# Patient Record
Sex: MALE | URBAN - METROPOLITAN AREA
[De-identification: names, ages, dates, MRNs, and addresses within clinical notes are randomized per-mention and may not be internally consistent; named-entity substitution may affect disease eponyms.]

---

## 2020-03-26 ENCOUNTER — APPOINTMENT (OUTPATIENT)
Dept: RADIOLOGY | Facility: MEDICAL CENTER | Age: 60
DRG: 641 | End: 2020-03-26
Attending: INTERNAL MEDICINE
Payer: COMMERCIAL

## 2020-03-26 ENCOUNTER — HOSPITAL ENCOUNTER (OUTPATIENT)
Facility: MEDICAL CENTER | Age: 60
DRG: 641 | End: 2020-03-26
Admitting: INTERNAL MEDICINE
Payer: COMMERCIAL

## 2020-03-26 ENCOUNTER — APPOINTMENT (OUTPATIENT)
Dept: CARDIOLOGY | Facility: MEDICAL CENTER | Age: 60
DRG: 641 | End: 2020-03-26
Attending: INTERNAL MEDICINE
Payer: COMMERCIAL

## 2020-03-26 ENCOUNTER — HOSPITAL ENCOUNTER (INPATIENT)
Facility: MEDICAL CENTER | Age: 60
LOS: 4 days | DRG: 641 | End: 2020-03-30
Attending: INTERNAL MEDICINE | Admitting: HOSPITALIST
Payer: COMMERCIAL

## 2020-03-26 PROBLEM — I10 ESSENTIAL HYPERTENSION: Status: ACTIVE | Noted: 2020-03-26

## 2020-03-26 PROBLEM — D61.818 PANCYTOPENIA (HCC): Status: ACTIVE | Noted: 2020-03-26

## 2020-03-26 PROBLEM — E87.6 HYPOKALEMIA: Status: ACTIVE | Noted: 2020-03-26

## 2020-03-26 PROBLEM — E11.65 UNCONTROLLED TYPE 2 DIABETES MELLITUS WITH HYPERGLYCEMIA (HCC): Status: ACTIVE | Noted: 2020-03-26

## 2020-03-26 PROBLEM — E87.3 METABOLIC ALKALOSIS: Status: ACTIVE | Noted: 2020-03-26

## 2020-03-26 PROBLEM — J44.9 COPD (CHRONIC OBSTRUCTIVE PULMONARY DISEASE) (HCC): Status: ACTIVE | Noted: 2020-03-26

## 2020-03-26 PROBLEM — R79.89 ABNORMAL LFTS: Status: ACTIVE | Noted: 2020-03-26

## 2020-03-26 PROBLEM — R60.0 PEDAL EDEMA: Status: ACTIVE | Noted: 2020-03-26

## 2020-03-26 PROBLEM — C34.90 SMALL CELL CARCINOMA OF LUNG (HCC): Status: ACTIVE | Noted: 2020-03-26

## 2020-03-26 LAB
ACTION RANGE TRIGGERED IACRT: YES
ALBUMIN SERPL BCP-MCNC: 3.3 G/DL (ref 3.2–4.9)
ALBUMIN/GLOB SERPL: 1.9 G/DL
ALP SERPL-CCNC: 203 U/L (ref 30–99)
ALT SERPL-CCNC: 112 U/L (ref 2–50)
ANION GAP SERPL CALC-SCNC: 13 MMOL/L (ref 7–16)
ANISOCYTOSIS BLD QL SMEAR: ABNORMAL
APTT PPP: 20.9 SEC (ref 24.7–36)
AST SERPL-CCNC: 46 U/L (ref 12–45)
BASE EXCESS BLDA CALC-SCNC: 26 MMOL/L (ref -4–3)
BASOPHILS # BLD AUTO: 0 % (ref 0–1.8)
BASOPHILS # BLD: 0 K/UL (ref 0–0.12)
BILIRUB SERPL-MCNC: 0.9 MG/DL (ref 0.1–1.5)
BODY TEMPERATURE: ABNORMAL DEGREES
BUN SERPL-MCNC: 17 MG/DL (ref 8–22)
CALCIUM SERPL-MCNC: 7.8 MG/DL (ref 8.5–10.5)
CHLORIDE SERPL-SCNC: 85 MMOL/L (ref 96–112)
CO2 BLDA-SCNC: >50 MMOL/L (ref 20–33)
CO2 SERPL-SCNC: 42 MMOL/L (ref 20–33)
CREAT SERPL-MCNC: 0.48 MG/DL (ref 0.5–1.4)
EKG IMPRESSION: NORMAL
EOSINOPHIL # BLD AUTO: 0 K/UL (ref 0–0.51)
EOSINOPHIL NFR BLD: 0 % (ref 0–6.9)
ERYTHROCYTE [DISTWIDTH] IN BLOOD BY AUTOMATED COUNT: 58.5 FL (ref 35.9–50)
GIANT PLATELETS BLD QL SMEAR: NORMAL
GLOBULIN SER CALC-MCNC: 1.7 G/DL (ref 1.9–3.5)
GLUCOSE BLD-MCNC: 317 MG/DL (ref 65–99)
GLUCOSE BLD-MCNC: 330 MG/DL (ref 65–99)
GLUCOSE BLD-MCNC: 333 MG/DL (ref 65–99)
GLUCOSE SERPL-MCNC: 300 MG/DL (ref 65–99)
HCO3 BLDA-SCNC: 52.9 MMOL/L (ref 17–25)
HCT VFR BLD AUTO: 30.8 % (ref 42–52)
HGB BLD-MCNC: 9.9 G/DL (ref 14–18)
HOROWITZ INDEX BLDA+IHG-RTO: 1675 MM[HG]
INR PPP: 1.01 (ref 0.87–1.13)
INST. QUALIFIED PATIENT IIQPT: YES
LG PLATELETS BLD QL SMEAR: NORMAL
LV EJECT FRACT  99904: 65
LV EJECT FRACT MOD 2C 99903: 68.54
LV EJECT FRACT MOD 4C 99902: 51.78
LV EJECT FRACT MOD BP 99901: 60.23
LYMPHOCYTES # BLD AUTO: 0.55 K/UL (ref 1–4.8)
LYMPHOCYTES NFR BLD: 13.7 % (ref 22–41)
MACROCYTES BLD QL SMEAR: ABNORMAL
MAGNESIUM SERPL-MCNC: 1.8 MG/DL (ref 1.5–2.5)
MANUAL DIFF BLD: NORMAL
MCH RBC QN AUTO: 33.1 PG (ref 27–33)
MCHC RBC AUTO-ENTMCNC: 32.1 G/DL (ref 33.7–35.3)
MCV RBC AUTO: 103 FL (ref 81.4–97.8)
METAMYELOCYTES NFR BLD MANUAL: 3.4 %
MONOCYTES # BLD AUTO: 0.38 K/UL (ref 0–0.85)
MONOCYTES NFR BLD AUTO: 9.4 % (ref 0–13.4)
MORPHOLOGY BLD-IMP: NORMAL
MYELOCYTES NFR BLD MANUAL: 1.7 %
NEUTROPHILS # BLD AUTO: 2.87 K/UL (ref 1.82–7.42)
NEUTROPHILS NFR BLD: 70.1 % (ref 44–72)
NEUTS BAND NFR BLD MANUAL: 1.7 % (ref 0–10)
NRBC # BLD AUTO: 0.09 K/UL
NRBC BLD-RTO: 2.3 /100 WBC
O2/TOTAL GAS SETTING VFR VENT: 4 %
OVALOCYTES BLD QL SMEAR: NORMAL
PCO2 BLDA: 61.4 MMHG (ref 26–37)
PCO2 TEMP ADJ BLDA: 61.4 MMHG (ref 26–37)
PH BLDA: 7.54 [PH] (ref 7.4–7.5)
PH TEMP ADJ BLDA: 7.54 [PH] (ref 7.4–7.5)
PHOSPHATE SERPL-MCNC: 3.3 MG/DL (ref 2.5–4.5)
PLATELET # BLD AUTO: 134 K/UL (ref 164–446)
PLATELET BLD QL SMEAR: NORMAL
PMV BLD AUTO: 10.3 FL (ref 9–12.9)
PO2 BLDA: 67 MMHG (ref 64–87)
PO2 TEMP ADJ BLDA: 67 MMHG (ref 64–87)
POLYCHROMASIA BLD QL SMEAR: NORMAL
POTASSIUM SERPL-SCNC: 2.5 MMOL/L (ref 3.6–5.5)
POTASSIUM SERPL-SCNC: 3 MMOL/L (ref 3.6–5.5)
POTASSIUM SERPL-SCNC: 3.2 MMOL/L (ref 3.6–5.5)
PROT SERPL-MCNC: 5 G/DL (ref 6–8.2)
PROTHROMBIN TIME: 13.5 SEC (ref 12–14.6)
RBC # BLD AUTO: 2.99 M/UL (ref 4.7–6.1)
RBC BLD AUTO: PRESENT
SAO2 % BLDA: 94 % (ref 93–99)
SODIUM SERPL-SCNC: 140 MMOL/L (ref 135–145)
SPECIMEN DRAWN FROM PATIENT: ABNORMAL
WBC # BLD AUTO: 4 K/UL (ref 4.8–10.8)

## 2020-03-26 PROCEDURE — 700105 HCHG RX REV CODE 258

## 2020-03-26 PROCEDURE — 93005 ELECTROCARDIOGRAM TRACING: CPT | Performed by: INTERNAL MEDICINE

## 2020-03-26 PROCEDURE — 93306 TTE W/DOPPLER COMPLETE: CPT

## 2020-03-26 PROCEDURE — 85610 PROTHROMBIN TIME: CPT

## 2020-03-26 PROCEDURE — 84132 ASSAY OF SERUM POTASSIUM: CPT

## 2020-03-26 PROCEDURE — 83735 ASSAY OF MAGNESIUM: CPT

## 2020-03-26 PROCEDURE — 71045 X-RAY EXAM CHEST 1 VIEW: CPT

## 2020-03-26 PROCEDURE — 84100 ASSAY OF PHOSPHORUS: CPT

## 2020-03-26 PROCEDURE — 85027 COMPLETE CBC AUTOMATED: CPT

## 2020-03-26 PROCEDURE — A9270 NON-COVERED ITEM OR SERVICE: HCPCS | Performed by: INTERNAL MEDICINE

## 2020-03-26 PROCEDURE — 85730 THROMBOPLASTIN TIME PARTIAL: CPT

## 2020-03-26 PROCEDURE — 82803 BLOOD GASES ANY COMBINATION: CPT

## 2020-03-26 PROCEDURE — 94660 CPAP INITIATION&MGMT: CPT

## 2020-03-26 PROCEDURE — 99291 CRITICAL CARE FIRST HOUR: CPT | Performed by: INTERNAL MEDICINE

## 2020-03-26 PROCEDURE — 93970 EXTREMITY STUDY: CPT

## 2020-03-26 PROCEDURE — 700111 HCHG RX REV CODE 636 W/ 250 OVERRIDE (IP): Performed by: INTERNAL MEDICINE

## 2020-03-26 PROCEDURE — 80053 COMPREHEN METABOLIC PANEL: CPT

## 2020-03-26 PROCEDURE — 93010 ELECTROCARDIOGRAM REPORT: CPT | Performed by: INTERNAL MEDICINE

## 2020-03-26 PROCEDURE — 700111 HCHG RX REV CODE 636 W/ 250 OVERRIDE (IP): Performed by: HOSPITALIST

## 2020-03-26 PROCEDURE — 82962 GLUCOSE BLOOD TEST: CPT

## 2020-03-26 PROCEDURE — 93970 EXTREMITY STUDY: CPT | Mod: 26 | Performed by: INTERNAL MEDICINE

## 2020-03-26 PROCEDURE — 770022 HCHG ROOM/CARE - ICU (200)

## 2020-03-26 PROCEDURE — 700102 HCHG RX REV CODE 250 W/ 637 OVERRIDE(OP): Performed by: INTERNAL MEDICINE

## 2020-03-26 PROCEDURE — 700105 HCHG RX REV CODE 258: Performed by: INTERNAL MEDICINE

## 2020-03-26 PROCEDURE — 36600 WITHDRAWAL OF ARTERIAL BLOOD: CPT

## 2020-03-26 PROCEDURE — 93306 TTE W/DOPPLER COMPLETE: CPT | Mod: 26 | Performed by: INTERNAL MEDICINE

## 2020-03-26 PROCEDURE — 99223 1ST HOSP IP/OBS HIGH 75: CPT | Performed by: HOSPITALIST

## 2020-03-26 PROCEDURE — 85007 BL SMEAR W/DIFF WBC COUNT: CPT

## 2020-03-26 RX ORDER — BUDESONIDE AND FORMOTEROL FUMARATE DIHYDRATE 160; 4.5 UG/1; UG/1
2 AEROSOL RESPIRATORY (INHALATION) 2 TIMES DAILY
Status: DISCONTINUED | OUTPATIENT
Start: 2020-03-26 | End: 2020-03-30 | Stop reason: HOSPADM

## 2020-03-26 RX ORDER — SODIUM CHLORIDE 9 MG/ML
INJECTION, SOLUTION INTRAVENOUS
Status: COMPLETED
Start: 2020-03-26 | End: 2020-03-26

## 2020-03-26 RX ORDER — AMOXICILLIN 250 MG
2 CAPSULE ORAL 2 TIMES DAILY
Status: DISCONTINUED | OUTPATIENT
Start: 2020-03-26 | End: 2020-03-30 | Stop reason: HOSPADM

## 2020-03-26 RX ORDER — IPRATROPIUM BROMIDE AND ALBUTEROL SULFATE 2.5; .5 MG/3ML; MG/3ML
3 SOLUTION RESPIRATORY (INHALATION)
Status: DISCONTINUED | OUTPATIENT
Start: 2020-03-26 | End: 2020-03-30 | Stop reason: HOSPADM

## 2020-03-26 RX ORDER — POLYETHYLENE GLYCOL 3350 17 G/17G
1 POWDER, FOR SOLUTION ORAL
Status: DISCONTINUED | OUTPATIENT
Start: 2020-03-26 | End: 2020-03-30 | Stop reason: HOSPADM

## 2020-03-26 RX ORDER — SODIUM CHLORIDE, SODIUM LACTATE, POTASSIUM CHLORIDE, AND CALCIUM CHLORIDE .6; .31; .03; .02 G/100ML; G/100ML; G/100ML; G/100ML
1000 INJECTION, SOLUTION INTRAVENOUS ONCE
Status: COMPLETED | OUTPATIENT
Start: 2020-03-26 | End: 2020-03-26

## 2020-03-26 RX ORDER — LISINOPRIL 20 MG/1
40 TABLET ORAL
Status: DISCONTINUED | OUTPATIENT
Start: 2020-03-26 | End: 2020-03-30 | Stop reason: HOSPADM

## 2020-03-26 RX ORDER — HYDRALAZINE HYDROCHLORIDE 25 MG/1
25 TABLET, FILM COATED ORAL EVERY 6 HOURS PRN
Status: DISCONTINUED | OUTPATIENT
Start: 2020-03-26 | End: 2020-03-30 | Stop reason: HOSPADM

## 2020-03-26 RX ORDER — BUDESONIDE AND FORMOTEROL FUMARATE DIHYDRATE 160; 4.5 UG/1; UG/1
2 AEROSOL RESPIRATORY (INHALATION)
Status: DISCONTINUED | OUTPATIENT
Start: 2020-03-26 | End: 2020-03-26

## 2020-03-26 RX ORDER — BISACODYL 10 MG
10 SUPPOSITORY, RECTAL RECTAL
Status: DISCONTINUED | OUTPATIENT
Start: 2020-03-26 | End: 2020-03-30 | Stop reason: HOSPADM

## 2020-03-26 RX ORDER — MAGNESIUM SULFATE HEPTAHYDRATE 40 MG/ML
4 INJECTION, SOLUTION INTRAVENOUS ONCE
Status: COMPLETED | OUTPATIENT
Start: 2020-03-26 | End: 2020-03-26

## 2020-03-26 RX ORDER — SIMVASTATIN 20 MG
20 TABLET ORAL EVERY EVENING
Status: DISCONTINUED | OUTPATIENT
Start: 2020-03-26 | End: 2020-03-30 | Stop reason: HOSPADM

## 2020-03-26 RX ORDER — FAMOTIDINE 20 MG/1
20 TABLET, FILM COATED ORAL DAILY
Status: DISCONTINUED | OUTPATIENT
Start: 2020-03-26 | End: 2020-03-28

## 2020-03-26 RX ORDER — POTASSIUM CHLORIDE 20 MEQ/1
60 TABLET, EXTENDED RELEASE ORAL DAILY
Status: DISCONTINUED | OUTPATIENT
Start: 2020-03-26 | End: 2020-03-27

## 2020-03-26 RX ORDER — POTASSIUM CHLORIDE 7.45 MG/ML
10 INJECTION INTRAVENOUS
Status: COMPLETED | OUTPATIENT
Start: 2020-03-26 | End: 2020-03-26

## 2020-03-26 RX ORDER — POTASSIUM CHLORIDE 20 MEQ/1
60 TABLET, EXTENDED RELEASE ORAL ONCE
Status: COMPLETED | OUTPATIENT
Start: 2020-03-26 | End: 2020-03-26

## 2020-03-26 RX ORDER — MONTELUKAST SODIUM 10 MG/1
10 TABLET ORAL NIGHTLY
Status: DISCONTINUED | OUTPATIENT
Start: 2020-03-26 | End: 2020-03-30 | Stop reason: HOSPADM

## 2020-03-26 RX ADMIN — INSULIN HUMAN 10 UNITS: 100 INJECTION, SOLUTION PARENTERAL at 23:01

## 2020-03-26 RX ADMIN — POTASSIUM CHLORIDE 10 MEQ: 7.46 INJECTION, SOLUTION INTRAVENOUS at 15:43

## 2020-03-26 RX ADMIN — POTASSIUM CHLORIDE 10 MEQ: 7.46 INJECTION, SOLUTION INTRAVENOUS at 11:10

## 2020-03-26 RX ADMIN — FAMOTIDINE 20 MG: 20 TABLET ORAL at 12:15

## 2020-03-26 RX ADMIN — HYDRALAZINE HYDROCHLORIDE 25 MG: 25 TABLET, FILM COATED ORAL at 16:07

## 2020-03-26 RX ADMIN — POTASSIUM CHLORIDE 10 MEQ: 7.46 INJECTION, SOLUTION INTRAVENOUS at 13:24

## 2020-03-26 RX ADMIN — MONTELUKAST 10 MG: 10 TABLET, FILM COATED ORAL at 19:48

## 2020-03-26 RX ADMIN — POTASSIUM CHLORIDE 60 MEQ: 1500 TABLET, EXTENDED RELEASE ORAL at 19:48

## 2020-03-26 RX ADMIN — INSULIN HUMAN 5 UNITS: 100 INJECTION, SOLUTION PARENTERAL at 10:19

## 2020-03-26 RX ADMIN — LISINOPRIL 40 MG: 20 TABLET ORAL at 12:14

## 2020-03-26 RX ADMIN — POTASSIUM CHLORIDE 10 MEQ: 7.46 INJECTION, SOLUTION INTRAVENOUS at 16:51

## 2020-03-26 RX ADMIN — INSULIN HUMAN 10 UNITS: 100 INJECTION, SOLUTION PARENTERAL at 17:04

## 2020-03-26 RX ADMIN — SIMVASTATIN 20 MG: 20 TABLET, FILM COATED ORAL at 16:51

## 2020-03-26 RX ADMIN — HYDRALAZINE HYDROCHLORIDE 25 MG: 25 TABLET, FILM COATED ORAL at 09:49

## 2020-03-26 RX ADMIN — MAGNESIUM SULFATE IN WATER 4 G: 40 INJECTION, SOLUTION INTRAVENOUS at 10:11

## 2020-03-26 RX ADMIN — POTASSIUM CHLORIDE 10 MEQ: 7.46 INJECTION, SOLUTION INTRAVENOUS at 10:09

## 2020-03-26 RX ADMIN — BUDESONIDE AND FORMOTEROL FUMARATE DIHYDRATE 2 PUFF: 160; 4.5 AEROSOL RESPIRATORY (INHALATION) at 21:09

## 2020-03-26 RX ADMIN — SODIUM CHLORIDE, POTASSIUM CHLORIDE, SODIUM LACTATE AND CALCIUM CHLORIDE 1000 ML: 600; 310; 30; 20 INJECTION, SOLUTION INTRAVENOUS at 14:00

## 2020-03-26 RX ADMIN — POTASSIUM CHLORIDE 60 MEQ: 1500 TABLET, EXTENDED RELEASE ORAL at 10:08

## 2020-03-26 RX ADMIN — POTASSIUM CHLORIDE 10 MEQ: 7.46 INJECTION, SOLUTION INTRAVENOUS at 12:15

## 2020-03-26 RX ADMIN — ENOXAPARIN SODIUM 40 MG: 100 INJECTION SUBCUTANEOUS at 16:51

## 2020-03-26 RX ADMIN — POTASSIUM CHLORIDE 10 MEQ: 7.46 INJECTION, SOLUTION INTRAVENOUS at 14:21

## 2020-03-26 RX ADMIN — SODIUM CHLORIDE 500 ML: 9 INJECTION, SOLUTION INTRAVENOUS at 10:12

## 2020-03-26 RX ADMIN — POTASSIUM CHLORIDE 10 MEQ: 7.46 INJECTION, SOLUTION INTRAVENOUS at 18:13

## 2020-03-26 ASSESSMENT — PATIENT HEALTH QUESTIONNAIRE - PHQ9
1. LITTLE INTEREST OR PLEASURE IN DOING THINGS: NOT AT ALL
SUM OF ALL RESPONSES TO PHQ9 QUESTIONS 1 AND 2: 0
2. FEELING DOWN, DEPRESSED, IRRITABLE, OR HOPELESS: NOT AT ALL

## 2020-03-26 ASSESSMENT — LIFESTYLE VARIABLES
TOTAL SCORE: 0
AVERAGE NUMBER OF DAYS PER WEEK YOU HAVE A DRINK CONTAINING ALCOHOL: 0
TOTAL SCORE: 0
CONSUMPTION TOTAL: NEGATIVE
EVER HAD A DRINK FIRST THING IN THE MORNING TO STEADY YOUR NERVES TO GET RID OF A HANGOVER: NO
HAVE YOU EVER FELT YOU SHOULD CUT DOWN ON YOUR DRINKING: NO
TOTAL SCORE: 0
ON A TYPICAL DAY WHEN YOU DRINK ALCOHOL HOW MANY DRINKS DO YOU HAVE: 0
EVER FELT BAD OR GUILTY ABOUT YOUR DRINKING: NO
HOW MANY TIMES IN THE PAST YEAR HAVE YOU HAD 5 OR MORE DRINKS IN A DAY: 0
ALCOHOL_USE: NO
HAVE PEOPLE ANNOYED YOU BY CRITICIZING YOUR DRINKING: NO
EVER_SMOKED: YES

## 2020-03-26 ASSESSMENT — ENCOUNTER SYMPTOMS
SORE THROAT: 0
NERVOUS/ANXIOUS: 0
HEADACHES: 0
CHILLS: 0
ABDOMINAL PAIN: 0
DIZZINESS: 0
VOMITING: 0
BACK PAIN: 0
COUGH: 1
SHORTNESS OF BREATH: 1
BLURRED VISION: 0
DIARRHEA: 0
FEVER: 0

## 2020-03-26 ASSESSMENT — COGNITIVE AND FUNCTIONAL STATUS - GENERAL
SUGGESTED CMS G CODE MODIFIER MOBILITY: CH
SUGGESTED CMS G CODE MODIFIER DAILY ACTIVITY: CH
MOBILITY SCORE: 24
DAILY ACTIVITIY SCORE: 24

## 2020-03-26 NOTE — H&P
Hospital Medicine History & Physical Note    Date of Service  3/26/2020    Primary Care Physician  Dr. Peoples  Oncologist is Dr. Murray    Consultants  Critical care. I discussed with Dr. Vizcaino.    Code Status  Full code by default    Chief Complaint  Swelling legs    History of Presenting Illness  59 y.o. male who presented 3/26/2020 with swelling of his lower extremities Mr. Pino has a past medical history of recently diagnosed small cell lung cancer, diabetes mellitus, hypertension, and COPD that apparently was referred from his primary care provider, Dr. Peoples, to the emergency room in [facility] due to low potassium levels that were followed up as an outpatient.  Currently he is also been struggling with his diabetes and is seen an endocrinologist though sugars have been in the 300-500 range.  Also notable, by records from [facility], is that he has had significant swelling for quite some time.  He presented the emergency room with these complaints there was given potassium as well as Lasix given his marked hypokalemia of 2.3 and significant anasarca.  His blood sugars there were in the mid 300 range and he was given insulin.  He was being admitted to the hospital in [place] and the decision was made to transfer him for higher level of care to our intensive care unit given his refractory hypokalemia.  In the intensive care unit, patient is an extremely poor historian and seems to have no real grasp on any of his medical conditions or treatments outpatient.  He is quite somnolent at this point in time the only real history we have is the ER note from [facility] dictated by Dr. Guerrero which is actually quite remarkably good and is quite thorough.  Unfortunately we do not have his home medication reconciliation list and this will be obtained.    Review of Systems  Review of Systems   Unable to perform ROS: Mental acuity       Past Medical History  CHF unknown type  Small Cell Carcinoma Lung  Cancer  Insulin-dependent diabetes mellitus  Dyslipidemia  Hypertension  COPD unknown oxygen requirements    Surgical History  Vocal hernia surgery    Family History  Cannot be obtained as patient is a poor historian    Social History   Is in [facility] per records from [place] drinks 1-2 beers per week ongoing tobacco use reportedly lives alone    Allergies  Penicillin gives him anaphylaxis    Medications  None       Physical Exam       Physical Exam  Vitals signs and nursing note reviewed.   Constitutional:       General: He is not in acute distress.     Appearance: Normal appearance.   HENT:      Mouth/Throat:      Mouth: Mucous membranes are dry.      Pharynx: Oropharynx is clear.   Eyes:      General: No scleral icterus.     Conjunctiva/sclera: Conjunctivae normal.   Neck:      Musculoskeletal: Normal range of motion and neck supple.   Cardiovascular:      Rate and Rhythm: Normal rate and regular rhythm.      Comments: Distant heart sounds sinus rhythm  Pulmonary:      Comments: Creased work of breathing, crackles throughout, no rhonchi no wheezing  Abdominal:      General: There is distension.      Tenderness: There is no abdominal tenderness.   Musculoskeletal:      Comments: 3+ pitting edema bilaterally and symmetrically   Skin:     General: Skin is dry.      Coloration: Skin is pale.      Comments: Excoriations on the shins   Neurological:      Comments: He moves his extremities equally though effectively is a non-historian and somewhat sleepy   Psychiatric:      Comments: He is compliant with exam, flat affect         Laboratory:          No results for input(s): ALTSGPT, ASTSGOT, ALKPHOSPHAT, TBILIRUBIN, DBILIRUBIN, GAMMAGT, AMYLASE, LIPASE, ALB, PREALBUMIN, GLUCOSE in the last 72 hours.      No results for input(s): NTPROBNP in the last 72 hours.      No results for input(s): TROPONINT in the last 72 hours.    Urinalysis:    No results found     Imaging:  US-EXTREMITY VENOUS LOWER BILAT   Final  Result      EC-ECHOCARDIOGRAM COMPLETE W/O CONT    (Results Pending)   DX-CHEST-PORTABLE (1 VIEW)    (Results Pending)         Assessment/Plan:  I anticipate this patient will require at least two midnights for appropriate medical management, necessitating inpatient admission.    * Hypokalemia- (present on admission)  Assessment & Plan  Severe hypokalemia was refractory to treatment may have been potentiated by IV Lasix  Will be given aggressive IV and oral potassium and will check a magnesium level and address accordingly  Continuous telemetry monitoring to follow for possible arrhythmias    Small cell carcinoma of lung (HCC)- (present on admission)  Assessment & Plan  Recently diagnosed cancer followed by  in [facility]  Details remains somewhat cryptic will try and obtain records from [place]    COPD (chronic obstructive pulmonary disease) (HCC)- (present on admission)  Assessment & Plan  History of  Patient states he is not on oxygen at home    Pedal edema- (present on admission)  Assessment & Plan  Severe peripheral edema consistent with anasarca  Echocardiogram has been ordered  This may be secondary to right heart failure, diastolic dysfunction, or may be due to hypoalbuminemia   Refrain from further diuresis until his potassium comes up    Uncontrolled type 2 diabetes mellitus with hyperglycemia (HCC)- (present on admission)  Assessment & Plan  Blood sugar upon presentation emergency room in [facility]  His home regimen is unknown we will request that his home medication reconciliation list is filled out  Patient denies a previous history of diabetes  Glycohemoglobin has been ordered    Abnormal LFTs- (present on admission)  Assessment & Plan  Consider hepatic congestion though await his echocardiogram  Consider non-alcoholic fatty liver disease  We do not have a prior reference range of liver enzymes  He does not have any abdominal tenderness    Pancytopenia (HCC)- (present on  admission)  Assessment & Plan  This is in the setting of lung cancer it is conceivable that he is on chemotherapy though patient cannot at this time give a history  We will try and obtain records from [facility]  We do not have a prior reference range    Essential hypertension- (present on admission)  Assessment & Plan  History of  His home regimen is unknown will be obtained      VTE prophylaxis: Lovenox

## 2020-03-26 NOTE — ASSESSMENT & PLAN NOTE
Basal bolus insulin  Sugars at home are wildly out of control, between 300-500 he reports.  He may not tolerate normal sugars, so goal sugars should be around 150-250 for now

## 2020-03-26 NOTE — ASSESSMENT & PLAN NOTE
Probably cancer/chemo related  RN to help get records from [facility]

## 2020-03-26 NOTE — ASSESSMENT & PLAN NOTE
BLE 3+ edema  Intravascularly dry  Probably venous insufficiency and poor nutritional state  TTE normal  BLE dopplers without clot

## 2020-03-26 NOTE — PROGRESS NOTES
Received transfer request from [facility]  Sending Physician: Dr. Muñiz  Diagnosis: Severe Hypokalemia  Type of transfer requested: Inpatient to Inpatient  Specialist consulted: Dr. Breaux  Patient Accepted  Accepting Physician: Dr. Breaux  Patient coming via: [facility]  ETA: [phone]  Room Assignment: T631  Nursing to notify Dr. Breaux upon patient arrival to unit.

## 2020-03-26 NOTE — CONSULTS
"Critical Care Consultation    Date of consult: 3/26/2020    Referring Physician  Antonia Vizcaino M.D.    Reason for Consultation  Hypokalemia    History of Presenting Illness  59 y.o. male who presented 3/26/2020 with SCLC who presented to the ER at [facility] after his PCP checked labs and found his potassium to be 2.6. He was diagnosed with his cancer around December and received his first round of chemotherapy 3 weeks ago at [facility].  He can't remember the medications he received and says he just got the first round with plans for another round next week.  He's generally been feeling run down and fatigued.  His blood sugar is very high at home and he's not sure if he takes his insulin correctly. He denies recent fever, dizziness, HA, increase in dyspnea, chest pain, abdominal pain, n/v, diarrhea.  He has been urinating a lot and feeling very thirsty. He has a chronic cough which is \"maybe a little worse\" than usual, but it doesn't sound significantly worse. No URI symptoms.  No COVID contacts. In the ER at [facility] his potassium was still very low and they felt they couldn't manage it there, so he was transferred to [facility] for high level of care.     Code Status  Full Code    Review of Systems  Review of Systems   Constitutional: Negative for chills and fever.   HENT: Negative for congestion and sore throat.    Eyes: Negative for blurred vision.   Respiratory: Positive for cough (chronic) and shortness of breath (chronic).    Cardiovascular: Positive for leg swelling. Negative for chest pain.   Gastrointestinal: Negative for abdominal pain, diarrhea and vomiting.   Genitourinary:        Polyuria   Musculoskeletal: Negative for back pain.   Skin: Positive for itching. Negative for rash.   Neurological: Negative for dizziness and headaches.   Psychiatric/Behavioral: The patient is not nervous/anxious.        Past Medical History   has a past medical history of Cancer (HCC), CHF " (congestive heart failure) (HCC), COPD (chronic obstructive pulmonary disease) (HCC), and Diabetes (HCC).    Surgical History   has a past surgical history that includes abdominal exploration.    Family History  family history is not on file.    Social History   reports that he has been smoking. He does not have any smokeless tobacco history on file. He reports previous alcohol use. He reports previous drug use.    Medications  Home Medications    **Home medications have not yet been reviewed for this encounter**       Current Facility-Administered Medications   Medication Dose Route Frequency Provider Last Rate Last Dose   • hydrALAZINE (APRESOLINE) tablet 25 mg  25 mg Oral Q6HRS PRN Antonia Vizcaino M.D.   25 mg at 03/26/20 1607   • insulin regular (HUMULIN R) injection 3-14 Units  3-14 Units Subcutaneous Q6HRS Antonia Vizcaino M.D.   5 Units at 03/26/20 1019    And   • glucose 4 g chewable tablet 16 g  16 g Oral Q15 MIN PRN Antonia Vizcaino M.D.        And   • DEXTROSE 10% BOLUS 250 mL  250 mL Intravenous Q15 MIN PRN Antonia Vizcaino M.D.       • potassium chloride SA (Kdur) tablet 60 mEq  60 mEq Oral DAILY Antonia Vizcaino M.D.   60 mEq at 03/26/20 1008   • potassium chloride (KCL) ivpb 10 mEq  10 mEq Intravenous Q HOUR Antonia Vizcaino M.D. 100 mL/hr at 03/26/20 1543 10 mEq at 03/26/20 1543   • ipratropium-albuterol (DUONEB) nebulizer solution  3 mL Nebulization Q4H PRN (RT) Antonia Vizcaino M.D.       • famotidine (PEPCID) tablet 20 mg  20 mg Oral DAILY Antonia Vizcaino M.D.   20 mg at 03/26/20 1215   • lisinopril (PRINIVIL) tablet 40 mg  40 mg Oral Q DAY Antonia Vizcaino M.D.   40 mg at 03/26/20 1214   • montelukast (SINGULAIR) tablet 10 mg  10 mg Oral Nightly Antonia Vizcaino M.D.       • simvastatin (ZOCOR) tablet 20 mg  20 mg Oral Q EVENING Antonia Vizcaino M.D.       • budesonide-formoterol (SYMBICORT) 160-4.5 MCG/ACT inhaler 2 Puff  2 Puff Inhalation BID Antonia Vizcaino M.D.       • enoxaparin (LOVENOX) inj 40 mg   40 mg Subcutaneous DAILY Fidel Ca M.D.       • senna-docusate (PERICOLACE or SENOKOT S) 8.6-50 MG per tablet 2 Tab  2 Tab Oral BID Fidel Ca M.D.        And   • polyethylene glycol/lytes (MIRALAX) PACKET 1 Packet  1 Packet Oral QDAY PRN Fidel Ca M.D.        And   • magnesium hydroxide (MILK OF MAGNESIA) suspension 30 mL  30 mL Oral QDAY PRN Fidel Ca M.D.        And   • bisacodyl (DULCOLAX) suppository 10 mg  10 mg Rectal QDAY PRN Fidel Ca M.D.           Allergies  Allergies   Allergen Reactions   • Penicillins Anaphylaxis and Swelling     Facial swellin       Vital Signs last 24 hours  Temp:  [36.3 °C (97.3 °F)-36.6 °C (97.8 °F)] 36.6 °C (97.8 °F)  Pulse:  [67-81] 67  Resp:  [19-32] 24  BP: (165-188)/(84-96) 165/84  SpO2:  [90 %-96 %] 95 %    Physical Exam  Physical Exam  Constitutional:       General: He is not in acute distress.     Comments: Dirty hands   HENT:      Nose: No congestion.      Mouth/Throat:      Mouth: Mucous membranes are dry.   Eyes:      Pupils: Pupils are equal, round, and reactive to light.   Cardiovascular:      Rate and Rhythm: Normal rate.      Heart sounds: Normal heart sounds.   Pulmonary:      Breath sounds: Wheezing present.      Comments: Mildly increased respiratory distress  Abdominal:      General: Bowel sounds are normal. There is no distension.      Palpations: Abdomen is soft.      Tenderness: There is no abdominal tenderness.   Musculoskeletal:      Right lower leg: Edema present.      Left lower leg: Edema present.   Skin:     General: Skin is warm and dry.      Comments: Excoriations over legs   Neurological:      General: No focal deficit present.      Mental Status: He is alert and oriented to person, place, and time.   Psychiatric:         Mood and Affect: Mood normal.         Fluids    Intake/Output Summary (Last 24 hours) at 3/26/2020 1613  Last data filed at 3/26/2020 1300  Gross per 24 hour   Intake 165 ml   Output 1500 ml   Net -1335 ml        Laboratory  Recent Results (from the past 48 hour(s))   CBC WITH DIFFERENTIAL    Collection Time: 03/26/20  8:13 AM   Result Value Ref Range    WBC 4.0 (L) 4.8 - 10.8 K/uL    RBC 2.99 (L) 4.70 - 6.10 M/uL    Hemoglobin 9.9 (L) 14.0 - 18.0 g/dL    Hematocrit 30.8 (L) 42.0 - 52.0 %    .0 (H) 81.4 - 97.8 fL    MCH 33.1 (H) 27.0 - 33.0 pg    MCHC 32.1 (L) 33.7 - 35.3 g/dL    RDW 58.5 (H) 35.9 - 50.0 fL    Platelet Count 134 (L) 164 - 446 K/uL    MPV 10.3 9.0 - 12.9 fL    Neutrophils-Polys 70.10 44.00 - 72.00 %    Lymphocytes 13.70 (L) 22.00 - 41.00 %    Monocytes 9.40 0.00 - 13.40 %    Eosinophils 0.00 0.00 - 6.90 %    Basophils 0.00 0.00 - 1.80 %    Nucleated RBC 2.30 /100 WBC    Neutrophils (Absolute) 2.87 1.82 - 7.42 K/uL    Lymphs (Absolute) 0.55 (L) 1.00 - 4.80 K/uL    Monos (Absolute) 0.38 0.00 - 0.85 K/uL    Eos (Absolute) 0.00 0.00 - 0.51 K/uL    Baso (Absolute) 0.00 0.00 - 0.12 K/uL    NRBC (Absolute) 0.09 K/uL    Anisocytosis 1+     Macrocytosis 1+    Comp Metabolic Panel    Collection Time: 03/26/20  8:13 AM   Result Value Ref Range    Sodium 140 135 - 145 mmol/L    Potassium 2.5 (LL) 3.6 - 5.5 mmol/L    Chloride 85 (L) 96 - 112 mmol/L    Co2 42 (HH) 20 - 33 mmol/L    Anion Gap 13.0 7.0 - 16.0    Glucose 300 (H) 65 - 99 mg/dL    Bun 17 8 - 22 mg/dL    Creatinine 0.48 (L) 0.50 - 1.40 mg/dL    Calcium 7.8 (L) 8.5 - 10.5 mg/dL    AST(SGOT) 46 (H) 12 - 45 U/L    ALT(SGPT) 112 (H) 2 - 50 U/L    Alkaline Phosphatase 203 (H) 30 - 99 U/L    Total Bilirubin 0.9 0.1 - 1.5 mg/dL    Albumin 3.3 3.2 - 4.9 g/dL    Total Protein 5.0 (L) 6.0 - 8.2 g/dL    Globulin 1.7 (L) 1.9 - 3.5 g/dL    A-G Ratio 1.9 g/dL   MAGNESIUM    Collection Time: 03/26/20  8:13 AM   Result Value Ref Range    Magnesium 1.8 1.5 - 2.5 mg/dL   PHOSPHORUS    Collection Time: 03/26/20  8:13 AM   Result Value Ref Range    Phosphorus 3.3 2.5 - 4.5 mg/dL   Prothrombin Time    Collection Time: 03/26/20  8:13 AM   Result Value Ref Range    PT  13.5 12.0 - 14.6 sec    INR 1.01 0.87 - 1.13   APTT    Collection Time: 20  8:13 AM   Result Value Ref Range    APTT 20.9 (L) 24.7 - 36.0 sec   ESTIMATED GFR    Collection Time: 20  8:13 AM   Result Value Ref Range    GFR If African American >60 >60 mL/min/1.73 m 2    GFR If Non African American >60 >60 mL/min/1.73 m 2   DIFFERENTIAL MANUAL    Collection Time: 20  8:13 AM   Result Value Ref Range    Bands-Stabs 1.70 0.00 - 10.00 %    Metamyelocytes 3.40 %    Myelocytes 1.70 %    Manual Diff Status PERFORMED    PERIPHERAL SMEAR REVIEW    Collection Time: 20  8:13 AM   Result Value Ref Range    Peripheral Smear Review see below    PLATELET ESTIMATE    Collection Time: 20  8:13 AM   Result Value Ref Range    Plt Estimation Decreased    MORPHOLOGY    Collection Time: 20  8:13 AM   Result Value Ref Range    RBC Morphology Present     Large Platelets 1+     Giant Platelets 1+     Polychromia 1+     Ovalocytes 1+    ACCU-CHEK GLUCOSE    Collection Time: 20  8:58 AM   Result Value Ref Range    Glucose - Accu-Ck 330 (H) 65 - 99 mg/dL   EKG    Collection Time: 20  9:25 AM   Result Value Ref Range    Report       [facility]    Test Date:  2020  Pt Name:    MARÍA PLUMMER                   Department: 161  MRN:        [id number]                      Room:       [facility]  Gender:     Male                         Technician: PEP  :        1960                   Requested By:FABIOLA SWEENEY  Order #:    [id number]                    Reading MD:    Measurements  Intervals                                Axis  Rate:       62                           P:          43  NM:         140                          QRS:        -4  QRSD:       106                          T:          40  QT:         446  QTc:        453    Interpretive Statements  SINUS RHYTHM  MINIMAL ST DEPRESSION, LATERAL LEADS  No previous ECG available for comparison     ISTAT ARTERIAL BLOOD GAS    Collection Time:  03/26/20 11:34 AM   Result Value Ref Range    Ph 7.543 (H) 7.400 - 7.500    Pco2 61.4 (HH) 26.0 - 37.0 mmHg    Po2 67 64 - 87 mmHg    Tco2 >50 (HH) 20 - 33 mmol/L    S02 94 93 - 99 %    Hco3 52.9 (H) 17.0 - 25.0 mmol/L    BE 26 (H) -4 - 3 mmol/L    Body Temp 37.0 C degrees    O2 Therapy 4 %    iPF Ratio 1675     Ph Temp Eric 7.543 (H) 7.400 - 7.500    Pco2 Temp Co 61.4 (HH) 26.0 - 37.0 mmHg    Po2 Temp Cor 67 64 - 87 mmHg    Specimen Arterial     Action Range Triggered YES     Inst. Qualified Patient YES        Imaging  US-EXTREMITY VENOUS LOWER BILAT   Final Result      EC-ECHOCARDIOGRAM COMPLETE W/O CONT    (Results Pending)   DX-CHEST-PORTABLE (1 VIEW)    (Results Pending)       Assessment/Plan  * Hypokalemia- (present on admission)  Assessment & Plan  Severe, life threatening hypokalemia  I suspect he has had polyuria from his poorly controlled DM resulting in excess urinary potassium loss  Chemotherapy could be contributing, although he denies GI losses  Aggressive PO and IV repletion  Check K q6h    Small cell carcinoma of lung (HCC)- (present on admission)  Assessment & Plan  Request records from [facility]  Unknown stage and he doesn't know his treatment regimen    COPD (chronic obstructive pulmonary disease) (HCC)- (present on admission)  Assessment & Plan  Chronic retainer  Start nocturnal bipap.  His baseline PCO2 is probably around 70      Pedal edema- (present on admission)  Assessment & Plan  BLE 3+ edema  Odd because I think he's intravascularly dry from hyperglycemic diuresis  He reports a h/o CHF  TTE  BLE dopplers    Uncontrolled type 2 diabetes mellitus with hyperglycemia (HCC)- (present on admission)  Assessment & Plan  Severe hyperglycemia  Ideally would do an insulin gtt, but I'm concerned about dropping his K and it's already critically low  SSI only now, then drip when K has increased    Abnormal LFTs- (present on admission)  Assessment & Plan  Probably cancer related    Pancytopenia (HCC)-  (present on admission)  Assessment & Plan  Probably cancer/chemo related  RN to help get records from [facility]    Metabolic alkalosis  Assessment & Plan  Suspect he's a chronic retainer from COPD  Check ABG      Essential hypertension- (present on admission)  Assessment & Plan  Home meds, prn hydral      Discussed patient condition and risk of morbidity and/or mortality with Hospitalist, RN, RT, Therapies, Pharmacy, Dietary, Charge nurse / hot rounds and Patient.    The patient remains critically ill with severe, life-threatening hypokalemia requiring near-continuous infusion of high dose potassium.  Critical care time = 55 minutes in directly providing and coordinating critical care and extensive data review.  No time overlap and excludes procedures.

## 2020-03-26 NOTE — ASSESSMENT & PLAN NOTE
History of  He was restarted back on his home Lisinopril 40 mg and Norvasc 10 mg but his blood pressure remains quite high  Added aldactone 50 mg BID and his blood pressure is still high thus add labetalol 200 mg BID

## 2020-03-26 NOTE — ASSESSMENT & PLAN NOTE
Requested records from [facility] pending  Unknown stage and he doesn't know his treatment regimen

## 2020-03-26 NOTE — ASSESSMENT & PLAN NOTE
Consider hepatic congestion though await his echocardiogram  Consider non-alcoholic fatty liver disease  We do not have a prior reference range of liver enzymes  He does not have any abdominal tenderness

## 2020-03-26 NOTE — ASSESSMENT & PLAN NOTE
Severe hypokalemia was refractory to treatment may have been potentiated by IV Lasix  He has been given aggressive IV and oral potassium and his K remains low at 2.6  He has been restarted back on his home lisinopril 40 mg and KCl 20 mEq TID has been added and will increase to 40 mEq TID  Added aldactone 50 mg BID for potassium-sparing effects as well as diuresis  Continuous telemetry monitoring to follow for possible arrhythmias  BMP ordered for the morning

## 2020-03-26 NOTE — ASSESSMENT & PLAN NOTE
Improved to 3.3 today  I suspect he has had polyuria from his poorly controlled DM resulting in excess urinary potassium loss  Chemotherapy could be contributing, although he denies GI losses  Aggressive PO and IV repletion  Check K q12

## 2020-03-26 NOTE — ASSESSMENT & PLAN NOTE
Severe peripheral edema consistent with anasarca  Echocardiogram reveals normal EF and does not mention diastolic dysfunction  Refrain from Lasix until his potassium comes up and add aldactone  Added MEL hose

## 2020-03-26 NOTE — CARE PLAN
Problem: Bowel/Gastric:  Goal: Normal bowel function is maintained or improved  Intervention: Educate patient and significant other/support system about diet, fluid intake, medications and activity to promote bowel function  Note: Pt had large brown BM     Problem: Discharge Barriers/Planning  Goal: Patient's continuum of care needs will be met  Intervention: Assess potential discharge barriers on admission and throughout hospital stay  Note: Patient having potassium replacements through shift

## 2020-03-26 NOTE — ASSESSMENT & PLAN NOTE
Recently diagnosed cancer followed by  in [facility]  He is due for chemotherapy on Monday but will have to be pushed back a day or two  Palliative Care consultation

## 2020-03-26 NOTE — ASSESSMENT & PLAN NOTE
Blood sugar upon presentation emergency room in [facility] 389  Restart home insulin 70/30  Blood sugars have been quite high thus supplemental sliding scale has been used  Glycohemoglobin high at 10.2

## 2020-03-26 NOTE — ASSESSMENT & PLAN NOTE
Chronic retainer  Start nocturnal bipap.  His baseline PCO2 is probably around 70  He tolerated it OK last night, but would need a sleep study as outpatient to get it at home

## 2020-03-26 NOTE — ASSESSMENT & PLAN NOTE
This is in the setting of lung cancer it is conceivable that he is on chemotherapy though patient cannot at this time give a history  We will try and obtain records from [facility]  We do not have a prior reference range

## 2020-03-27 LAB
ALBUMIN SERPL BCP-MCNC: 3.1 G/DL (ref 3.2–4.9)
ALBUMIN/GLOB SERPL: 1.7 G/DL
ALP SERPL-CCNC: 195 U/L (ref 30–99)
ALT SERPL-CCNC: 94 U/L (ref 2–50)
ANION GAP SERPL CALC-SCNC: 11 MMOL/L (ref 7–16)
AST SERPL-CCNC: 36 U/L (ref 12–45)
BILIRUB SERPL-MCNC: 0.8 MG/DL (ref 0.1–1.5)
BUN SERPL-MCNC: 14 MG/DL (ref 8–22)
CALCIUM SERPL-MCNC: 7.4 MG/DL (ref 8.5–10.5)
CHLORIDE SERPL-SCNC: 90 MMOL/L (ref 96–112)
CO2 SERPL-SCNC: 37 MMOL/L (ref 20–33)
CREAT SERPL-MCNC: 0.4 MG/DL (ref 0.5–1.4)
ERYTHROCYTE [DISTWIDTH] IN BLOOD BY AUTOMATED COUNT: 59.9 FL (ref 35.9–50)
EST. AVERAGE GLUCOSE BLD GHB EST-MCNC: 246 MG/DL
GLOBULIN SER CALC-MCNC: 1.8 G/DL (ref 1.9–3.5)
GLUCOSE BLD-MCNC: 284 MG/DL (ref 65–99)
GLUCOSE BLD-MCNC: 343 MG/DL (ref 65–99)
GLUCOSE BLD-MCNC: 390 MG/DL (ref 65–99)
GLUCOSE BLD-MCNC: 390 MG/DL (ref 65–99)
GLUCOSE SERPL-MCNC: 285 MG/DL (ref 65–99)
HBA1C MFR BLD: 10.2 % (ref 0–5.6)
HCT VFR BLD AUTO: 33 % (ref 42–52)
HGB BLD-MCNC: 10.7 G/DL (ref 14–18)
MAGNESIUM SERPL-MCNC: 1.9 MG/DL (ref 1.5–2.5)
MCH RBC QN AUTO: 33.8 PG (ref 27–33)
MCHC RBC AUTO-ENTMCNC: 32.4 G/DL (ref 33.7–35.3)
MCV RBC AUTO: 104.1 FL (ref 81.4–97.8)
PLATELET # BLD AUTO: 122 K/UL (ref 164–446)
PMV BLD AUTO: 9.9 FL (ref 9–12.9)
POTASSIUM SERPL-SCNC: 3.3 MMOL/L (ref 3.6–5.5)
POTASSIUM SERPL-SCNC: 3.7 MMOL/L (ref 3.6–5.5)
PROT SERPL-MCNC: 4.9 G/DL (ref 6–8.2)
RBC # BLD AUTO: 3.17 M/UL (ref 4.7–6.1)
SODIUM SERPL-SCNC: 138 MMOL/L (ref 135–145)
WBC # BLD AUTO: 5.2 K/UL (ref 4.8–10.8)

## 2020-03-27 PROCEDURE — 85027 COMPLETE CBC AUTOMATED: CPT

## 2020-03-27 PROCEDURE — 700102 HCHG RX REV CODE 250 W/ 637 OVERRIDE(OP): Performed by: HOSPITALIST

## 2020-03-27 PROCEDURE — 770020 HCHG ROOM/CARE - TELE (206)

## 2020-03-27 PROCEDURE — 84132 ASSAY OF SERUM POTASSIUM: CPT

## 2020-03-27 PROCEDURE — 700101 HCHG RX REV CODE 250: Performed by: INTERNAL MEDICINE

## 2020-03-27 PROCEDURE — 80053 COMPREHEN METABOLIC PANEL: CPT

## 2020-03-27 PROCEDURE — A9270 NON-COVERED ITEM OR SERVICE: HCPCS | Performed by: HOSPITALIST

## 2020-03-27 PROCEDURE — 700111 HCHG RX REV CODE 636 W/ 250 OVERRIDE (IP): Performed by: INTERNAL MEDICINE

## 2020-03-27 PROCEDURE — 83735 ASSAY OF MAGNESIUM: CPT

## 2020-03-27 PROCEDURE — 700111 HCHG RX REV CODE 636 W/ 250 OVERRIDE (IP): Performed by: HOSPITALIST

## 2020-03-27 PROCEDURE — 99233 SBSQ HOSP IP/OBS HIGH 50: CPT | Performed by: INTERNAL MEDICINE

## 2020-03-27 PROCEDURE — 83036 HEMOGLOBIN GLYCOSYLATED A1C: CPT

## 2020-03-27 PROCEDURE — 99233 SBSQ HOSP IP/OBS HIGH 50: CPT | Performed by: HOSPITALIST

## 2020-03-27 PROCEDURE — 36415 COLL VENOUS BLD VENIPUNCTURE: CPT

## 2020-03-27 PROCEDURE — 700102 HCHG RX REV CODE 250 W/ 637 OVERRIDE(OP): Performed by: INTERNAL MEDICINE

## 2020-03-27 PROCEDURE — A9270 NON-COVERED ITEM OR SERVICE: HCPCS | Performed by: INTERNAL MEDICINE

## 2020-03-27 PROCEDURE — 82962 GLUCOSE BLOOD TEST: CPT | Mod: 91

## 2020-03-27 RX ORDER — POTASSIUM CHLORIDE 750 MG/1
10 TABLET, EXTENDED RELEASE ORAL 2 TIMES DAILY
Status: ON HOLD | COMMUNITY
End: 2020-03-30

## 2020-03-27 RX ORDER — AMLODIPINE BESYLATE 10 MG/1
10 TABLET ORAL DAILY
COMMUNITY

## 2020-03-27 RX ORDER — FAMOTIDINE 20 MG/1
20 TABLET, FILM COATED ORAL 2 TIMES DAILY
COMMUNITY

## 2020-03-27 RX ORDER — ALBUTEROL SULFATE 90 UG/1
1 AEROSOL, METERED RESPIRATORY (INHALATION) EVERY 4 HOURS PRN
Status: ON HOLD | COMMUNITY
End: 2020-03-27

## 2020-03-27 RX ORDER — GLIPIZIDE 5 MG/1
5 TABLET ORAL 2 TIMES DAILY
Status: ON HOLD | COMMUNITY
End: 2020-03-27

## 2020-03-27 RX ORDER — POTASSIUM CHLORIDE 20 MEQ/1
20 TABLET, EXTENDED RELEASE ORAL 3 TIMES DAILY
Status: DISCONTINUED | OUTPATIENT
Start: 2020-03-27 | End: 2020-03-29

## 2020-03-27 RX ORDER — FUROSEMIDE 40 MG/1
40 TABLET ORAL DAILY
Status: ON HOLD | COMMUNITY
End: 2020-03-27

## 2020-03-27 RX ORDER — METOPROLOL TARTRATE 100 MG/1
100 TABLET ORAL 2 TIMES DAILY
COMMUNITY

## 2020-03-27 RX ORDER — AMLODIPINE BESYLATE 10 MG/1
10 TABLET ORAL
Status: DISCONTINUED | OUTPATIENT
Start: 2020-03-27 | End: 2020-03-30 | Stop reason: HOSPADM

## 2020-03-27 RX ORDER — ENALAPRILAT 1.25 MG/ML
1.25 INJECTION INTRAVENOUS EVERY 6 HOURS PRN
Status: DISCONTINUED | OUTPATIENT
Start: 2020-03-27 | End: 2020-03-30 | Stop reason: HOSPADM

## 2020-03-27 RX ORDER — ONDANSETRON 8 MG/1
8 TABLET, ORALLY DISINTEGRATING ORAL EVERY 6 HOURS PRN
COMMUNITY

## 2020-03-27 RX ORDER — LABETALOL HYDROCHLORIDE 5 MG/ML
10 INJECTION, SOLUTION INTRAVENOUS EVERY 6 HOURS PRN
Status: DISCONTINUED | OUTPATIENT
Start: 2020-03-27 | End: 2020-03-30 | Stop reason: HOSPADM

## 2020-03-27 RX ORDER — INSULIN GLARGINE 100 [IU]/ML
20 INJECTION, SOLUTION SUBCUTANEOUS
Status: DISCONTINUED | OUTPATIENT
Start: 2020-03-27 | End: 2020-03-28

## 2020-03-27 RX ORDER — IPRATROPIUM BROMIDE AND ALBUTEROL SULFATE 2.5; .5 MG/3ML; MG/3ML
3 SOLUTION RESPIRATORY (INHALATION) EVERY 4 HOURS PRN
Status: ON HOLD | COMMUNITY
End: 2020-03-27

## 2020-03-27 RX ORDER — LISINOPRIL 20 MG/1
40 TABLET ORAL DAILY
Status: ON HOLD | COMMUNITY
End: 2020-03-27

## 2020-03-27 RX ORDER — POTASSIUM CHLORIDE 7.45 MG/ML
10 INJECTION INTRAVENOUS
Status: COMPLETED | OUTPATIENT
Start: 2020-03-27 | End: 2020-03-27

## 2020-03-27 RX ORDER — LISINOPRIL 40 MG/1
40 TABLET ORAL DAILY
COMMUNITY

## 2020-03-27 RX ORDER — FUROSEMIDE 40 MG/1
40 TABLET ORAL DAILY
Status: ON HOLD | COMMUNITY
End: 2020-03-30

## 2020-03-27 RX ADMIN — LABETALOL HYDROCHLORIDE 10 MG: 5 INJECTION, SOLUTION INTRAVENOUS at 17:03

## 2020-03-27 RX ADMIN — POTASSIUM CHLORIDE 60 MEQ: 1500 TABLET, EXTENDED RELEASE ORAL at 05:05

## 2020-03-27 RX ADMIN — MONTELUKAST 10 MG: 10 TABLET, FILM COATED ORAL at 21:00

## 2020-03-27 RX ADMIN — POTASSIUM CHLORIDE 10 MEQ: 7.46 INJECTION, SOLUTION INTRAVENOUS at 02:11

## 2020-03-27 RX ADMIN — INSULIN HUMAN 12 UNITS: 100 INJECTION, SOLUTION PARENTERAL at 16:45

## 2020-03-27 RX ADMIN — SIMVASTATIN 20 MG: 20 TABLET, FILM COATED ORAL at 16:57

## 2020-03-27 RX ADMIN — ENALAPRILAT 1.25 MG: 1.25 INJECTION INTRAVENOUS at 06:10

## 2020-03-27 RX ADMIN — LABETALOL HYDROCHLORIDE 10 MG: 5 INJECTION, SOLUTION INTRAVENOUS at 08:30

## 2020-03-27 RX ADMIN — FAMOTIDINE 20 MG: 20 TABLET ORAL at 05:05

## 2020-03-27 RX ADMIN — HYDRALAZINE HYDROCHLORIDE 25 MG: 25 TABLET, FILM COATED ORAL at 21:00

## 2020-03-27 RX ADMIN — POTASSIUM CHLORIDE 10 MEQ: 7.46 INJECTION, SOLUTION INTRAVENOUS at 08:29

## 2020-03-27 RX ADMIN — ENALAPRILAT 1.25 MG: 1.25 INJECTION INTRAVENOUS at 00:10

## 2020-03-27 RX ADMIN — LABETALOL HYDROCHLORIDE 10 MG: 5 INJECTION, SOLUTION INTRAVENOUS at 01:05

## 2020-03-27 RX ADMIN — INSULIN GLARGINE 20 UNITS: 100 INJECTION, SOLUTION SUBCUTANEOUS at 09:42

## 2020-03-27 RX ADMIN — HYDRALAZINE HYDROCHLORIDE 25 MG: 25 TABLET, FILM COATED ORAL at 03:09

## 2020-03-27 RX ADMIN — HYDRALAZINE HYDROCHLORIDE 25 MG: 25 TABLET, FILM COATED ORAL at 10:31

## 2020-03-27 RX ADMIN — ENALAPRILAT 1.25 MG: 1.25 INJECTION INTRAVENOUS at 12:18

## 2020-03-27 RX ADMIN — BUDESONIDE AND FORMOTEROL FUMARATE DIHYDRATE 2 PUFF: 160; 4.5 AEROSOL RESPIRATORY (INHALATION) at 17:03

## 2020-03-27 RX ADMIN — ENOXAPARIN SODIUM 40 MG: 100 INJECTION SUBCUTANEOUS at 17:00

## 2020-03-27 RX ADMIN — INSULIN HUMAN 12 UNITS: 100 INJECTION, SOLUTION PARENTERAL at 12:10

## 2020-03-27 RX ADMIN — BUDESONIDE AND FORMOTEROL FUMARATE DIHYDRATE 2 PUFF: 160; 4.5 AEROSOL RESPIRATORY (INHALATION) at 05:01

## 2020-03-27 RX ADMIN — AMLODIPINE BESYLATE 10 MG: 10 TABLET ORAL at 09:07

## 2020-03-27 RX ADMIN — LISINOPRIL 40 MG: 20 TABLET ORAL at 05:05

## 2020-03-27 RX ADMIN — POTASSIUM CHLORIDE 10 MEQ: 7.46 INJECTION, SOLUTION INTRAVENOUS at 00:11

## 2020-03-27 RX ADMIN — POTASSIUM CHLORIDE 10 MEQ: 7.46 INJECTION, SOLUTION INTRAVENOUS at 06:04

## 2020-03-27 RX ADMIN — POTASSIUM CHLORIDE 10 MEQ: 7.46 INJECTION, SOLUTION INTRAVENOUS at 03:06

## 2020-03-27 RX ADMIN — POTASSIUM CHLORIDE 20 MEQ: 1500 TABLET, EXTENDED RELEASE ORAL at 16:57

## 2020-03-27 RX ADMIN — INSULIN HUMAN 7 UNITS: 100 INJECTION, SOLUTION PARENTERAL at 05:03

## 2020-03-27 ASSESSMENT — COPD QUESTIONNAIRES
DURING THE PAST 4 WEEKS HOW MUCH DID YOU FEEL SHORT OF BREATH: MOST  OR ALL OF THE TIME
COPD SCREENING SCORE: 8
DO YOU EVER COUGH UP ANY MUCUS OR PHLEGM?: YES, EVERY DAY
HAVE YOU SMOKED AT LEAST 100 CIGARETTES IN YOUR ENTIRE LIFE: YES

## 2020-03-27 ASSESSMENT — ENCOUNTER SYMPTOMS
SHORTNESS OF BREATH: 0
DIZZINESS: 0
COUGH: 0

## 2020-03-27 ASSESSMENT — FIBROSIS 4 INDEX
FIB4 SCORE: 1.91
FIB4 SCORE: 1.8
FIB4 SCORE: 1.8

## 2020-03-27 NOTE — PROGRESS NOTES
Hospital Medicine Daily Progress Note    Date of Service  3/27/2020    Chief Complaint  59 y.o. male admitted 3/26/2020 with abnormal labs    Hospital Course    Mr. Pino is a 59 y.o. male who presented 3/26/2020 with swelling of his lower extremities Mr. Pino has a past medical history of recently diagnosed small cell lung cancer, diabetes mellitus, hypertension, and COPD that apparently was referred from his primary care provider, Dr. Peoples, to the emergency room in [facility] due to low potassium levels that were followed up as an outpatient.  Currently he is also been struggling with his diabetes and is seen an endocrinologist though sugars have been in the 300-500 range.  Also notable, by records from [facility], is that he has had significant swelling for quite some time.  He presented the emergency room with these complaints there was given potassium as well as Lasix given his marked hypokalemia of 2.3 and significant anasarca.  His blood sugars there were in the mid 300 range and he was given insulin.  He was being admitted to the hospital in [place] and the decision was made to transfer him for higher level of care to our intensive care unit given his refractory hypokalemia.       Interval Problem Update  3/27: patient seen and evaluated in the ICU.  Patient remains in overall quite poor historian.  I discussed with the medication reconciliation technician and they will try obtain his home medications.  He will be taken off of the IV potassium scale and placed on oral potassium.  Palliative care has consulted.    Consultants/Specialty  Critical care. I discussed with Dr. Vizcaino  Palliative care    Code Status  Full code by default    Disposition  tele    Review of Systems  Review of Systems   Unable to perform ROS: Mental acuity        Physical Exam  Temp:  [35.8 °C (96.5 °F)-36.8 °C (98.3 °F)] 36.1 °C (96.9 °F)  Pulse:  [61-80] 77  Resp:  [15-33] 20  BP: (149-192)/() 165/89  SpO2:  [92  %-100 %] 97 %    Physical Exam  Vitals signs and nursing note reviewed.   Constitutional:       General: He is not in acute distress.  HENT:      Mouth/Throat:      Mouth: Mucous membranes are moist.      Pharynx: Oropharynx is clear.   Eyes:      General: No scleral icterus.     Conjunctiva/sclera: Conjunctivae normal.   Neck:      Musculoskeletal: Normal range of motion and neck supple.   Cardiovascular:      Rate and Rhythm: Normal rate and regular rhythm.      Heart sounds: No murmur.   Pulmonary:      Effort: No respiratory distress.      Comments: Few crackles  No rhonchi  Poor air movement  Abdominal:      General: There is distension.      Tenderness: There is no abdominal tenderness.   Musculoskeletal:      Right lower leg: Edema present.      Left lower leg: Edema present.      Comments: 3+ edema bilat  +pitting   Skin:     General: Skin is warm and dry.   Neurological:      General: No focal deficit present.      Mental Status: He is alert.      Sensory: No sensory deficit.      Motor: No weakness.   Psychiatric:      Comments: He is in reasonable spirits though a poor historian         Fluids    Intake/Output Summary (Last 24 hours) at 3/27/2020 1256  Last data filed at 3/27/2020 0815  Gross per 24 hour   Intake 2806.67 ml   Output 4875 ml   Net -2068.33 ml       Laboratory  Recent Labs     03/26/20  0813 03/27/20  0503   WBC 4.0* 5.2   RBC 2.99* 3.17*   HEMOGLOBIN 9.9* 10.7*   HEMATOCRIT 30.8* 33.0*   .0* 104.1*   MCH 33.1* 33.8*   MCHC 32.1* 32.4*   RDW 58.5* 59.9*   PLATELETCT 134* 122*   MPV 10.3 9.9     Recent Labs     03/26/20  0813  03/26/20  2300 03/27/20  0503 03/27/20  1013   SODIUM 140  --   --  138  --    POTASSIUM 2.5*   < > 3.0* 3.3* 3.7   CHLORIDE 85*  --   --  90*  --    CO2 42*  --   --  37*  --    GLUCOSE 300*  --   --  285*  --    BUN 17  --   --  14  --    CREATININE 0.48*  --   --  0.40*  --    CALCIUM 7.8*  --   --  7.4*  --     < > = values in this interval not displayed.      Recent Labs     03/26/20  0813   APTT 20.9*   INR 1.01               Imaging  DX-CHEST-PORTABLE (1 VIEW)   Final Result         Diffuse interstitial prominence could be seen with edema, atypical infection or other chronic changes.      EC-ECHOCARDIOGRAM COMPLETE W/O CONT   Final Result      US-EXTREMITY VENOUS LOWER BILAT   Final Result           Assessment/Plan  * Hypokalemia- (present on admission)  Assessment & Plan  Severe hypokalemia was refractory to treatment may have been potentiated by IV Lasix  He has been given aggressive IV and oral potassium  Continuous telemetry monitoring to follow for possible arrhythmias  KCl 20 mEq TID and check BMP in the morning    Small cell carcinoma of lung (HCC)- (present on admission)  Assessment & Plan  Recently diagnosed cancer followed by  in [facility]  Details remains somewhat cryptic will try and obtain records from [place]  Palliative Care consultation    COPD (chronic obstructive pulmonary disease) (HCC)- (present on admission)  Assessment & Plan  History of  Patient states he is not on oxygen at home    Pedal edema- (present on admission)  Assessment & Plan  Severe peripheral edema consistent with anasarca  Echocardiogram has been ordered  This may be secondary to right heart failure, diastolic dysfunction, or may be due to hypoalbuminemia   Refrain from further diuresis until his potassium comes up  Add MEL hose    Uncontrolled type 2 diabetes mellitus with hyperglycemia (HCC)- (present on admission)  Assessment & Plan  Blood sugar upon presentation emergency room in [facility] 389  His home regimen is unknown we will request that his home medication reconciliation list is filled out  Patient denies a previous history of diabetes  Glycohemoglobin high at 10.2    Abnormal LFTs- (present on admission)  Assessment & Plan  Consider hepatic congestion though await his echocardiogram  Consider non-alcoholic fatty liver disease  We do not have  a prior reference range of liver enzymes  He does not have any abdominal tenderness    Pancytopenia (HCC)- (present on admission)  Assessment & Plan  This is in the setting of lung cancer it is conceivable that he is on chemotherapy though patient cannot at this time give a history  We will try and obtain records from [facility]  We do not have a prior reference range    Essential hypertension- (present on admission)  Assessment & Plan  History of  His home regimen is unknown will be obtained       VTE prophylaxis: lovenox

## 2020-03-27 NOTE — PROGRESS NOTES
Dr. Vizcaino gave order for a one time dose of 60 mEq of potassium tonight. Order read back and placed into Epic by RN

## 2020-03-27 NOTE — CONSULTS
"Reason for PC Consult: Advance Care Planning    Consulted by:   Dr. Vizcaino    Assessment:  General:   59 year old male admitted for severe hypokalemia on 3/26/20. Pt has a history of lung cancer (awaiting records to confirm type), diabetes, HTN, and COPD. Pt presented to [facility], pt found to be hypokalemic and was transferred to [facility] ED for further management.     Dyspnea: No, 97% on 4L NC  Last BM: 03/27/20    Pain: No    Depression: Mood appropriate for situation    Dementia: No       Spiritual:  Is Orthodoxy or spirituality important for coping with this illness? No, Pt declined visit from   Has a  or spiritual provider visit been requested? No    Palliative Performance Scale: 60%    Advance Directive: None on File    DPOA: None on File  POLST: None on File    Code Status: Full    Outcome:  PC RN visited pt at bedside, introduced self and role of PC. Discussed pt's understanding of clinical picture, pt verbalized poor insight into clinical picture. He does understand the importance of keeping IV potassium and being in the hospital. Pt did verbalized not understanding his diabetes nor how to manage it at home.    Discussed pt's cancer diagnosis, treatment plan and pt's goals of care. Pt again verbalized poor insight, states \"I take chemo\", but pt is unable to state what he takes, why and when. Pt did verbalized he was told by his oncologist that he has a year to live but he does have a patient who is going on 3 years. PC RN inquired if chemo was for curative or palliative means, pt states he isn't sure. He assumes that it is not to cure if he only has a years life expectancy.     Attempted to discuss GOC, benefits vs burdens of treatment and quality vs quantity of life. Pt became emotionally fatigued, avoided questions, declined to express thoughts and feelings. Pt started to experience anxiety, asked how much long will the discussion take and \"I don't want to miss my show that's " "on\".     PC RN discussed code status, AD and POLST form. Pt agreeable to completed AD, appointed step-mother Ara Brannon as DPOA, no alternates. Statements of desires #2, 3, & 5. Pt choose to complete declaration, he made multiple comments like \"what ever the doctor thinks is right\" and \"If the doctor thinks I should have it\". AD awaiting notary, declaration witnessed. Started to review POLST form, pt again started to avoid questions, making comments like \"Can't we be done\" and \"I'm missing my show\". Pt then requested to use the bathroom and finish his breakfast without GOC discussion.     Active listening, education, validation, normalization, therapeutic touch, and emotional support provided throughout encounter.    Updated:   Dr. Vizcaino    Plan:   AD awaiting notary. Continue GOC discussion, pt gets fatigued and anxiety easily and would benefit from gardenia and ongoing GOC discussions.       Thank you for allowing Palliative Care to participate in this patient's care. Please feel free to call [phone] with any questions or concerns.  "

## 2020-03-27 NOTE — PROGRESS NOTES
"Critical Care Progress Note    Date of admission  3/26/2020    Chief Complaint  59 y.o. male admitted 3/26/2020 with hypokalemia    Hospital Course    59 y.o. male who presented 3/26/2020 with SCLC who presented to the ER at [facility] after his PCP checked labs and found his potassium to be 2.6. He was diagnosed with his cancer around December and received his first round of chemotherapy 3 weeks ago at [facility].  He can't remember the medications he received and says he just got the first round with plans for another round next week.  He's generally been feeling run down and fatigued.  His blood sugar is very high at home and he's not sure if he takes his insulin correctly. He denies recent fever, dizziness, HA, increase in dyspnea, chest pain, abdominal pain, n/v, diarrhea.  He has been urinating a lot and feeling very thirsty. He has a chronic cough which is \"maybe a little worse\" than usual, but it doesn't sound significantly worse. No URI symptoms.  No COVID contacts. In the ER at [facility] his potassium was still very low and they felt they couldn't manage it there, so he was transferred to [facility] for high level of care.       Interval Problem Update  Reviewed last 24 hour events:  Neuro: intact  HR: 77   SBP: 170-180  Tmax: AF  GI:   I/O: -2873  Lines: PIV  Resp: RA  Vte: lovenox  PPI/H2:home pepcid  Antibx: n/a      Review of Systems  Review of Systems   Respiratory: Negative for cough and shortness of breath.    Cardiovascular: Negative for chest pain.   Neurological: Negative for dizziness.        Vital Signs for last 24 hours   Temp:  [35.8 °C (96.5 °F)-36.8 °C (98.3 °F)] 36.8 °C (98.3 °F)  Pulse:  [61-81] 77  Resp:  [15-33] 20  BP: (149-192)/() 184/95  SpO2:  [92 %-100 %] 97 %    Hemodynamic parameters for last 24 hours       Respiratory Information for the last 24 hours       Physical Exam   Physical Exam  Constitutional:       General: He is not in acute distress.     " Appearance: Normal appearance.      Comments: Dirty hands   HENT:      Head: Normocephalic and atraumatic.      Nose: No congestion.      Mouth/Throat:      Mouth: Mucous membranes are moist.   Eyes:      Pupils: Pupils are equal, round, and reactive to light.   Neck:      Musculoskeletal: No neck rigidity.   Cardiovascular:      Rate and Rhythm: Normal rate and regular rhythm.      Pulses: Normal pulses.      Heart sounds: Normal heart sounds.      Comments: Distant heart tones  Pulmonary:      Effort: Pulmonary effort is normal.      Breath sounds: Wheezing present.      Comments: Diminished air movement  Abdominal:      General: Abdomen is flat. Bowel sounds are normal. There is no distension.      Palpations: Abdomen is soft.      Tenderness: There is no abdominal tenderness.   Musculoskeletal:      Right lower leg: Edema present.      Left lower leg: Edema present.   Skin:     General: Skin is warm and dry.      Comments: Excoriations over legs   Neurological:      General: No focal deficit present.      Mental Status: He is alert and oriented to person, place, and time.   Psychiatric:         Mood and Affect: Mood normal.         Medications  Current Facility-Administered Medications   Medication Dose Route Frequency Provider Last Rate Last Dose   • K+ Scale: Goal of 4.5  1 Each Intravenous Q6HRS Antonia Vizcaino M.D.   1 Each at 03/27/20 0600   • labetalol (NORMODYNE/TRANDATE) injection 10 mg  10 mg Intravenous Q6HRS PRN Robert Breaux Jr., D.O.   10 mg at 03/27/20 0830   • enalaprilat (VASOTEC) injection 1.25 mg  1.25 mg Intravenous Q6HRS PRN Robert Breaux Jr., D.O.   1.25 mg at 03/27/20 0610   • Respiratory Therapy Consult   Nebulization Continuous RT Robert Breaux Jr., D.O.       • amLODIPine (NORVASC) tablet 10 mg  10 mg Oral Q DAY Antonia Vizcaino M.D.   10 mg at 03/27/20 0907   • insulin glargine (LANTUS) injection 20 Units  20 Units Subcutaneous QAM INSULIN Antonia Vizcaino M.D.   20 Units at  03/27/20 0942   • hydrALAZINE (APRESOLINE) tablet 25 mg  25 mg Oral Q6HRS PRN Antonia Vizcaino M.D.   25 mg at 03/27/20 0309   • insulin regular (HUMULIN R) injection 3-14 Units  3-14 Units Subcutaneous Q6HRS Antonia Vizcaino M.D.   7 Units at 03/27/20 0503    And   • glucose 4 g chewable tablet 16 g  16 g Oral Q15 MIN PRN Antonia Vizcaino M.D.        And   • DEXTROSE 10% BOLUS 250 mL  250 mL Intravenous Q15 MIN PRN Antonia Vizcaino M.D.       • ipratropium-albuterol (DUONEB) nebulizer solution  3 mL Nebulization Q4H PRN (RT) Antonia Vizcaino M.D.       • famotidine (PEPCID) tablet 20 mg  20 mg Oral DAILY Antonia Vizcaino M.D.   20 mg at 03/27/20 0505   • lisinopril (PRINIVIL) tablet 40 mg  40 mg Oral Q DAY Antonia Vizcaino M.D.   40 mg at 03/27/20 0505   • montelukast (SINGULAIR) tablet 10 mg  10 mg Oral Nightly Antonia Vizcaino M.D.   10 mg at 03/26/20 1948   • simvastatin (ZOCOR) tablet 20 mg  20 mg Oral Q EVENING Antonia Vizcaino M.D.   20 mg at 03/26/20 1651   • budesonide-formoterol (SYMBICORT) 160-4.5 MCG/ACT inhaler 2 Puff  2 Puff Inhalation BID Antonia Vizcaino M.D.   2 Puff at 03/27/20 0501   • enoxaparin (LOVENOX) inj 40 mg  40 mg Subcutaneous DAILY Fidel Ca M.D.   40 mg at 03/26/20 1651   • senna-docusate (PERICOLACE or SENOKOT S) 8.6-50 MG per tablet 2 Tab  2 Tab Oral BID Fidel Ca M.D.        And   • polyethylene glycol/lytes (MIRALAX) PACKET 1 Packet  1 Packet Oral QDAY PRN Fidel Ca M.D.        And   • magnesium hydroxide (MILK OF MAGNESIA) suspension 30 mL  30 mL Oral QDAY PRN Fidel Ca M.D.        And   • bisacodyl (DULCOLAX) suppository 10 mg  10 mg Rectal QDAY PRN Fidel Ca M.D.           Fluids    Intake/Output Summary (Last 24 hours) at 3/27/2020 1002  Last data filed at 3/27/2020 0815  Gross per 24 hour   Intake 2971.67 ml   Output 4875 ml   Net -1903.33 ml       Laboratory  Recent Labs     03/26/20  1134   ISTATAPH 7.543*   ISTATAPCO2 61.4*   ISTATAPO2 67   ISTATATCO2 >50*    [id number] 94   ISTATARTHCO3 52.9*   ISTATARTBE 26*   ISTATTEMP 37.0 C   ISTATFIO2 4   ISTATSPEC Arterial   ISTATAPHTC 7.543*   [id number] 67         Recent Labs     03/26/20  0813 03/26/20  1709 03/26/20  2300 03/27/20  0503   SODIUM 140  --   --  138   POTASSIUM 2.5* 3.2* 3.0* 3.3*   CHLORIDE 85*  --   --  90*   CO2 42*  --   --  37*   BUN 17  --   --  14   CREATININE 0.48*  --   --  0.40*   MAGNESIUM 1.8  --   --  1.9   PHOSPHORUS 3.3  --   --   --    CALCIUM 7.8*  --   --  7.4*     Recent Labs     03/26/20  0813 03/27/20  0503   ALTSGPT 112* 94*   ASTSGOT 46* 36   ALKPHOSPHAT 203* 195*   TBILIRUBIN 0.9 0.8   GLUCOSE 300* 285*     Recent Labs     03/26/20  0813 03/27/20  0503   WBC 4.0* 5.2   NEUTSPOLYS 70.10  --    LYMPHOCYTES 13.70*  --    MONOCYTES 9.40  --    EOSINOPHILS 0.00  --    BASOPHILS 0.00  --    ASTSGOT 46* 36   ALTSGPT 112* 94*   ALKPHOSPHAT 203* 195*   TBILIRUBIN 0.9 0.8     Recent Labs     03/26/20  0813 03/27/20  0503   RBC 2.99* 3.17*   HEMOGLOBIN 9.9* 10.7*   HEMATOCRIT 30.8* 33.0*   PLATELETCT 134* 122*   PROTHROMBTM 13.5  --    APTT 20.9*  --    INR 1.01  --        Imaging  X-Ray:  I have personally reviewed the images and compared with prior images. and My impression is: interstitial changes, probably related to his cancer and COPD    Assessment/Plan  * Hypokalemia- (present on admission)  Assessment & Plan  Improved to 3.3 today  I suspect he has had polyuria from his poorly controlled DM resulting in excess urinary potassium loss  Chemotherapy could be contributing, although he denies GI losses  Aggressive PO and IV repletion  Check K q12    Small cell carcinoma of lung (HCC)- (present on admission)  Assessment & Plan  Requested records from [facility] pending  Unknown stage and he doesn't know his treatment regimen    COPD (chronic obstructive pulmonary disease) (HCC)- (present on admission)  Assessment & Plan  Chronic retainer  Start nocturnal bipap.  His baseline PCO2 is probably  around 70  He tolerated it OK last night, but would need a sleep study as outpatient to get it at home      Pedal edema- (present on admission)  Assessment & Plan  BLE 3+ edema  Intravascularly dry  Probably venous insufficiency and poor nutritional state  TTE normal  BLE dopplers without clot    Uncontrolled type 2 diabetes mellitus with hyperglycemia (HCC)- (present on admission)  Assessment & Plan  Basal bolus insulin  Sugars at home are wildly out of control, between 300-500 he reports.  He may not tolerate normal sugars, so goal sugars should be around 150-250 for now    Abnormal LFTs- (present on admission)  Assessment & Plan  Probably cancer related    Pancytopenia (HCC)- (present on admission)  Assessment & Plan  Probably cancer/chemo related  RN to help get records from [facility]    Metabolic alkalosis  Assessment & Plan  ABG shows severe chronic CO2 retention      Essential hypertension- (present on admission)  Assessment & Plan  Home lisinopril  Add amlodipine  Very poor control  Prns IV       VTE:  Lovenox  Ulcer: Not Indicated  Lines: None    I have performed a physical exam and reviewed and updated ROS and Plan today (3/27/2020). In review of yesterday's note (3/26/2020), there are no changes except as documented above.     Discussed patient condition and risk of morbidity and/or mortality with Hospitalist, RN, RT, Therapies, Pharmacy, Dietary, Charge nurse / hot rounds and Patient

## 2020-03-27 NOTE — PROGRESS NOTES
2 RN skin check complete.   Devices in place: Silicone NC.  Skin assessed under devices: yes.  Confirmed pressure ulcers found on: N/A.  New potential pressure ulcers noted on: N/A. Wound consult placed: yes.  The following interventions in place: patient ambulatory, patient turns self in bed, patient skin assessed appropriately, pressure redistribution mattress.    Patient BLE are flakey and dry.  Scattered scabs/cracks to patient's BLE.  Wound to patient's right shin. Pictures taken.  Right heel is cracked.

## 2020-03-27 NOTE — PROGRESS NOTES
"Attempted to complete med. Pt cannot recall home meds, but says he takes \"something for blood pressure.\" Pt states he will call his doctor in the morning.  "

## 2020-03-27 NOTE — WOUND TEAM
Received wound consult for patient's right shin. Pt seen in [id number]. Bilateral legs assessed. Pt noted to have linear dry scabs. Pt's right shin noted to have a wound but appears to be dry. Left open to air. Pt's back assessed. Pt reports that his doctor removed possible cancer a few months ago. Appears to be stable and dry. Also left open to air. Skin care orders placed.

## 2020-03-27 NOTE — PROGRESS NOTES
"Med rec complete per pt and  List from [facility].   Per Pt he  finished 7 day course of Levaquin 500 mg PO QD started 2/15/2020.   Per Pt he took antibiotic for Pneumonia.  Pt said he should be on Chantix which was on Pharmacy list, but \"other place stopped it\".  Pt does not know names or  Strengths of medications.   Per Pt his Insulin was changed to \"70/30\" which is only insulin he is currently taking.  Per pharmacy Pt's current Potassium CL ER 10 MEQ PO BID started 3/24/2020.  Pt thinks he had medication 2 days ago, but not sure  [facility] updated.  Allergies reviewed   "

## 2020-03-27 NOTE — PROGRESS NOTES
2 RN skin check complete.   Devices in place: bipap/nasal cannula, 2 PIVs.  Skin assessed under devices.  Confirmed pressure ulcers found on: n/a.  New potential pressure ulcers noted on: n/a.  -open wound/scratches noted to RLE  -scratches to LLE  -scab on back  -photos uploaded, wound consult placed  The following interventions in place: grey foam on O2 tubing, gel on nose when bipap in place, rotate BP cuff site, pt turns self side to side.

## 2020-03-27 NOTE — CARE PLAN
Problem: Venous Thromboembolism (VTW)/Deep Vein Thrombosis (DVT) Prevention:  Goal: Patient will participate in Venous Thrombosis (VTE)/Deep Vein Thrombosis (DVT)Prevention Measures  Flowsheets (Taken 3/26/2020 2000)  Mechanical Prophylaxis: SCDs, Sequential Compression Device  SCDs, Sequential Compression Device: Refused  Pharmacologic Prophylaxis Used: LMWH: Enoxaparin(Lovenox)  Note: Pt ambulatory.     Problem: Respiratory:  Goal: Respiratory status will improve  Note: 4L NC at baseline. Pt refused noc bipap.

## 2020-03-27 NOTE — PROGRESS NOTES
"Unable to complete med rec at this time.  Per pt he doesn't know his medications.  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  I will contact [place] [facility].    "

## 2020-03-28 LAB
ANION GAP SERPL CALC-SCNC: 11 MMOL/L (ref 7–16)
BUN SERPL-MCNC: 15 MG/DL (ref 8–22)
CALCIUM SERPL-MCNC: 7.7 MG/DL (ref 8.5–10.5)
CHLORIDE SERPL-SCNC: 89 MMOL/L (ref 96–112)
CO2 SERPL-SCNC: 40 MMOL/L (ref 20–33)
CREAT SERPL-MCNC: 0.51 MG/DL (ref 0.5–1.4)
ERYTHROCYTE [DISTWIDTH] IN BLOOD BY AUTOMATED COUNT: 59.9 FL (ref 35.9–50)
GLUCOSE BLD-MCNC: 264 MG/DL (ref 65–99)
GLUCOSE BLD-MCNC: 275 MG/DL (ref 65–99)
GLUCOSE BLD-MCNC: 291 MG/DL (ref 65–99)
GLUCOSE BLD-MCNC: 384 MG/DL (ref 65–99)
GLUCOSE SERPL-MCNC: 410 MG/DL (ref 65–99)
HCT VFR BLD AUTO: 32.2 % (ref 42–52)
HGB BLD-MCNC: 10.3 G/DL (ref 14–18)
MAGNESIUM SERPL-MCNC: 1.8 MG/DL (ref 1.5–2.5)
MCH RBC QN AUTO: 33.1 PG (ref 27–33)
MCHC RBC AUTO-ENTMCNC: 32 G/DL (ref 33.7–35.3)
MCV RBC AUTO: 103.5 FL (ref 81.4–97.8)
PLATELET # BLD AUTO: 165 K/UL (ref 164–446)
PMV BLD AUTO: 9.9 FL (ref 9–12.9)
POTASSIUM SERPL-SCNC: 3 MMOL/L (ref 3.6–5.5)
RBC # BLD AUTO: 3.11 M/UL (ref 4.7–6.1)
SODIUM SERPL-SCNC: 140 MMOL/L (ref 135–145)
WBC # BLD AUTO: 6 K/UL (ref 4.8–10.8)

## 2020-03-28 PROCEDURE — 99233 SBSQ HOSP IP/OBS HIGH 50: CPT | Performed by: HOSPITALIST

## 2020-03-28 PROCEDURE — A9270 NON-COVERED ITEM OR SERVICE: HCPCS | Performed by: INTERNAL MEDICINE

## 2020-03-28 PROCEDURE — 700111 HCHG RX REV CODE 636 W/ 250 OVERRIDE (IP): Performed by: HOSPITALIST

## 2020-03-28 PROCEDURE — 94760 N-INVAS EAR/PLS OXIMETRY 1: CPT

## 2020-03-28 PROCEDURE — 700111 HCHG RX REV CODE 636 W/ 250 OVERRIDE (IP): Performed by: INTERNAL MEDICINE

## 2020-03-28 PROCEDURE — 770020 HCHG ROOM/CARE - TELE (206)

## 2020-03-28 PROCEDURE — 700102 HCHG RX REV CODE 250 W/ 637 OVERRIDE(OP): Performed by: HOSPITALIST

## 2020-03-28 PROCEDURE — 80048 BASIC METABOLIC PNL TOTAL CA: CPT

## 2020-03-28 PROCEDURE — 82962 GLUCOSE BLOOD TEST: CPT

## 2020-03-28 PROCEDURE — 85027 COMPLETE CBC AUTOMATED: CPT

## 2020-03-28 PROCEDURE — 36415 COLL VENOUS BLD VENIPUNCTURE: CPT

## 2020-03-28 PROCEDURE — A9270 NON-COVERED ITEM OR SERVICE: HCPCS | Performed by: HOSPITALIST

## 2020-03-28 PROCEDURE — 94640 AIRWAY INHALATION TREATMENT: CPT

## 2020-03-28 PROCEDURE — 700101 HCHG RX REV CODE 250: Performed by: INTERNAL MEDICINE

## 2020-03-28 PROCEDURE — 700102 HCHG RX REV CODE 250 W/ 637 OVERRIDE(OP): Performed by: INTERNAL MEDICINE

## 2020-03-28 PROCEDURE — 83735 ASSAY OF MAGNESIUM: CPT

## 2020-03-28 RX ORDER — SPIRONOLACTONE 25 MG/1
50 TABLET ORAL
Status: DISCONTINUED | OUTPATIENT
Start: 2020-03-28 | End: 2020-03-30 | Stop reason: HOSPADM

## 2020-03-28 RX ORDER — POTASSIUM CHLORIDE 20 MEQ/1
40 TABLET, EXTENDED RELEASE ORAL ONCE
Status: COMPLETED | OUTPATIENT
Start: 2020-03-28 | End: 2020-03-28

## 2020-03-28 RX ORDER — FAMOTIDINE 20 MG/1
20 TABLET, FILM COATED ORAL 2 TIMES DAILY
Status: DISCONTINUED | OUTPATIENT
Start: 2020-03-28 | End: 2020-03-30 | Stop reason: HOSPADM

## 2020-03-28 RX ADMIN — LABETALOL HYDROCHLORIDE 10 MG: 5 INJECTION, SOLUTION INTRAVENOUS at 17:53

## 2020-03-28 RX ADMIN — INSULIN HUMAN 12 UNITS: 100 INJECTION, SOLUTION PARENTERAL at 01:39

## 2020-03-28 RX ADMIN — INSULIN HUMAN 7 UNITS: 100 INJECTION, SOLUTION PARENTERAL at 11:49

## 2020-03-28 RX ADMIN — INSULIN GLARGINE 20 UNITS: 100 INJECTION, SOLUTION SUBCUTANEOUS at 06:22

## 2020-03-28 RX ADMIN — LISINOPRIL 40 MG: 20 TABLET ORAL at 06:12

## 2020-03-28 RX ADMIN — FAMOTIDINE 20 MG: 20 TABLET ORAL at 06:12

## 2020-03-28 RX ADMIN — ENALAPRILAT 1.25 MG: 1.25 INJECTION INTRAVENOUS at 04:35

## 2020-03-28 RX ADMIN — INSULIN HUMAN 20 UNITS: 100 INJECTION, SUSPENSION SUBCUTANEOUS at 16:51

## 2020-03-28 RX ADMIN — BUDESONIDE AND FORMOTEROL FUMARATE DIHYDRATE 2 PUFF: 160; 4.5 AEROSOL RESPIRATORY (INHALATION) at 06:20

## 2020-03-28 RX ADMIN — AMLODIPINE BESYLATE 10 MG: 10 TABLET ORAL at 06:12

## 2020-03-28 RX ADMIN — MONTELUKAST 10 MG: 10 TABLET, FILM COATED ORAL at 20:49

## 2020-03-28 RX ADMIN — ENALAPRILAT 1.25 MG: 1.25 INJECTION INTRAVENOUS at 16:15

## 2020-03-28 RX ADMIN — POTASSIUM CHLORIDE 20 MEQ: 1500 TABLET, EXTENDED RELEASE ORAL at 06:13

## 2020-03-28 RX ADMIN — INSULIN HUMAN 7 UNITS: 100 INJECTION, SOLUTION PARENTERAL at 06:22

## 2020-03-28 RX ADMIN — SPIRONOLACTONE 50 MG: 25 TABLET ORAL at 16:15

## 2020-03-28 RX ADMIN — HYDRALAZINE HYDROCHLORIDE 25 MG: 25 TABLET, FILM COATED ORAL at 03:23

## 2020-03-28 RX ADMIN — ENOXAPARIN SODIUM 40 MG: 100 INJECTION SUBCUTANEOUS at 17:33

## 2020-03-28 RX ADMIN — LABETALOL HYDROCHLORIDE 10 MG: 5 INJECTION, SOLUTION INTRAVENOUS at 07:03

## 2020-03-28 RX ADMIN — INSULIN HUMAN 7 UNITS: 100 INJECTION, SOLUTION PARENTERAL at 16:54

## 2020-03-28 RX ADMIN — SIMVASTATIN 20 MG: 20 TABLET, FILM COATED ORAL at 17:33

## 2020-03-28 RX ADMIN — POTASSIUM CHLORIDE 40 MEQ: 1500 TABLET, EXTENDED RELEASE ORAL at 04:35

## 2020-03-28 RX ADMIN — POTASSIUM CHLORIDE 20 MEQ: 1500 TABLET, EXTENDED RELEASE ORAL at 11:53

## 2020-03-28 RX ADMIN — BUDESONIDE AND FORMOTEROL FUMARATE DIHYDRATE 2 PUFF: 160; 4.5 AEROSOL RESPIRATORY (INHALATION) at 17:36

## 2020-03-28 RX ADMIN — POTASSIUM CHLORIDE 20 MEQ: 1500 TABLET, EXTENDED RELEASE ORAL at 17:32

## 2020-03-28 RX ADMIN — IPRATROPIUM BROMIDE AND ALBUTEROL SULFATE 3 ML: .5; 3 SOLUTION RESPIRATORY (INHALATION) at 13:13

## 2020-03-28 RX ADMIN — FAMOTIDINE 20 MG: 20 TABLET ORAL at 17:32

## 2020-03-28 RX ADMIN — SPIRONOLACTONE 50 MG: 25 TABLET ORAL at 11:53

## 2020-03-28 ASSESSMENT — ENCOUNTER SYMPTOMS
FEVER: 0
ROS GI COMMENTS: HUNGRY
SHORTNESS OF BREATH: 0
BLURRED VISION: 0

## 2020-03-28 ASSESSMENT — FIBROSIS 4 INDEX: FIB4 SCORE: 1.21

## 2020-03-28 NOTE — CARE PLAN
Problem: Safety  Goal: Will remain free from injury  Note: Pt mobility assessed at beginning of shift. Pt is up self. Fall precautions in place. Non-slip socks on. Bed in lowest locked position. Call light within reach. Pt educated to call for assistance and verbalizes understanding.      Problem: Knowledge Deficit  Goal: Knowledge of disease process/condition, treatment plan, diagnostic tests, and medications will improve  Note: Pt educated about disease process. Reason why medications are taken. And informed about treatment plan.

## 2020-03-28 NOTE — PROGRESS NOTES
Hospital Medicine Daily Progress Note    Date of Service  3/28/2020    Chief Complaint  59 y.o. male admitted 3/26/2020 with abnormal labs    Hospital Course    Mr. Pino is a 59 y.o. male who presented 3/26/2020 with swelling of his lower extremities Mr. Pino has a past medical history of recently diagnosed small cell lung cancer, diabetes mellitus, hypertension, and COPD that apparently was referred from his primary care provider, Dr. Peoples, to the emergency room in [facility] due to low potassium levels that were followed up as an outpatient.  Currently he is also been struggling with his diabetes and is seen an endocrinologist though sugars have been in the 300-500 range.  Also notable, by records from [facility], is that he has had significant swelling for quite some time.  He presented the emergency room with these complaints there was given potassium as well as Lasix given his marked hypokalemia of 2.3 and significant anasarca.  His blood sugars there were in the mid 300 range and he was given insulin.  He was being admitted to the hospital in [place] and the decision was made to transfer him for higher level of care to our intensive care unit given his refractory hypokalemia.       Interval Problem Update  3/27: patient seen and evaluated in the ICU.  Patient remains in overall quite poor historian.  I discussed with the medication reconciliation technician and they will try obtain his home medications.  He will be taken off of the IV potassium scale and placed on oral potassium.  Palliative care has consulted.  3/28: Mr. Pino was evaluated and examined on the tele floor.  Patient is much more lucid today and is able to convey that he is on 4 L of oxygen at home.  He states he is due for his chemotherapy next week Monday, Tuesday, and Wednesday in [facility].  His potassium remains quite low and his blood pressure is quite high.  I discussed with his nurse as well as the pharmacist,  Donna on the telemetry floor about how we are going to add Aldactone to his regimen given the persistent hypokalemia.  Consultants/Specialty  Critical care.   Palliative care    Code Status  Full code by default    Disposition  tele    Review of Systems  Review of Systems   Constitutional: Negative for fever.   Eyes: Negative for blurred vision.   Respiratory: Negative for shortness of breath.    Cardiovascular: Positive for leg swelling. Negative for chest pain.   Gastrointestinal:        Hungry   All other systems reviewed and are negative.       Physical Exam  Temp:  [36.1 °C (96.9 °F)-36.8 °C (98.3 °F)] 36.3 °C (97.4 °F)  Pulse:  [63-81] 63  Resp:  [18-20] 18  BP: (159-185)/(85-96) 182/95  SpO2:  [97 %-99 %] 97 %    Physical Exam  Vitals signs and nursing note reviewed.   Constitutional:       General: He is not in acute distress.     Appearance: He is not ill-appearing.   HENT:      Mouth/Throat:      Mouth: Mucous membranes are dry.      Pharynx: Oropharynx is clear.   Eyes:      General: No scleral icterus.     Conjunctiva/sclera: Conjunctivae normal.   Neck:      Musculoskeletal: Normal range of motion and neck supple.   Cardiovascular:      Rate and Rhythm: Normal rate and regular rhythm.      Heart sounds: No murmur.   Pulmonary:      Effort: No respiratory distress.      Breath sounds: Normal breath sounds.      Comments: moderate air movement  Abdominal:      General: There is distension.      Tenderness: There is no abdominal tenderness.   Musculoskeletal:      Right lower leg: Edema present.      Left lower leg: Edema present.      Comments: 3+ edema bilat  +pitting   Skin:     General: Skin is warm and dry.   Neurological:      General: No focal deficit present.      Mental Status: He is alert and oriented to person, place, and time.      Sensory: No sensory deficit.      Motor: No weakness.   Psychiatric:         Mood and Affect: Mood normal.         Behavior: Behavior normal.          Fluids    Intake/Output Summary (Last 24 hours) at 3/28/2020 0729  Last data filed at 3/27/2020 1812  Gross per 24 hour   Intake 840 ml   Output 1350 ml   Net -510 ml       Laboratory  Recent Labs     03/26/20  0813 03/27/20  0503 03/28/20  0111   WBC 4.0* 5.2 6.0   RBC 2.99* 3.17* 3.11*   HEMOGLOBIN 9.9* 10.7* 10.3*   HEMATOCRIT 30.8* 33.0* 32.2*   .0* 104.1* 103.5*   MCH 33.1* 33.8* 33.1*   MCHC 32.1* 32.4* 32.0*   RDW 58.5* 59.9* 59.9*   PLATELETCT 134* 122* 165   MPV 10.3 9.9 9.9     Recent Labs     03/26/20  0813  03/27/20  0503 03/27/20  1013 03/28/20  0111   SODIUM 140  --  138  --  140   POTASSIUM 2.5*   < > 3.3* 3.7 3.0*   CHLORIDE 85*  --  90*  --  89*   CO2 42*  --  37*  --  40*   GLUCOSE 300*  --  285*  --  410*   BUN 17  --  14  --  15   CREATININE 0.48*  --  0.40*  --  0.51   CALCIUM 7.8*  --  7.4*  --  7.7*    < > = values in this interval not displayed.     Recent Labs     03/26/20 0813   APTT 20.9*   INR 1.01               Imaging  DX-CHEST-PORTABLE (1 VIEW)   Final Result         Diffuse interstitial prominence could be seen with edema, atypical infection or other chronic changes.      EC-ECHOCARDIOGRAM COMPLETE W/O CONT   Final Result      US-EXTREMITY VENOUS LOWER BILAT   Final Result           Assessment/Plan  * Hypokalemia- (present on admission)  Assessment & Plan  Severe hypokalemia was refractory to treatment may have been potentiated by IV Lasix  He has been given aggressive IV and oral potassium and his K remains low at 3  He has been restarted back on his home lisinopril 40 mg and KCl 20 mEq TID has been added  Add aldactone 50 mg BID for potassium-sparing effects as well as diuresis  Continuous telemetry monitoring to follow for possible arrhythmias  BMP ordered for the morning    Small cell carcinoma of lung (HCC)- (present on admission)  Assessment & Plan  Recently diagnosed cancer followed by  in [facility]  He is due for chemotherapy on  Monday  Palliative Care consultation    COPD (chronic obstructive pulmonary disease) (HCC)- (present on admission)  Assessment & Plan  History of  Patient states he is on 4 liters at home and is on 5 liters here    Pedal edema- (present on admission)  Assessment & Plan  Severe peripheral edema consistent with anasarca  Echocardiogram reveals normal EF and does not mention diastolic dysfunction  Refrain from Lasix until his potassium comes up and add aldactone  Added MEL hose    Uncontrolled type 2 diabetes mellitus with hyperglycemia (HCC)- (present on admission)  Assessment & Plan  Blood sugar upon presentation emergency room in [facility] 389  Restart home insulin 70/30  Blood sugars have been quite high thus supplemental sliding scale has been used  Glycohemoglobin high at 10.2    Abnormal LFTs- (present on admission)  Assessment & Plan  Consider hepatic congestion though await his echocardiogram  Consider non-alcoholic fatty liver disease  We do not have a prior reference range of liver enzymes  He does not have any abdominal tenderness    Pancytopenia (HCC)- (present on admission)  Assessment & Plan  This is in the setting of lung cancer it is conceivable that he is on chemotherapy though patient cannot at this time give a history  We will try and obtain records from [facility]  We do not have a prior reference range    Essential hypertension- (present on admission)  Assessment & Plan  History of  He was restarted back on his home Lisinopril 40 mg and Norvasc 10 mg but his blood pressure remains quite high  Add aldactone 50 mg BID       VTE prophylaxis: lovenox

## 2020-03-28 NOTE — PROGRESS NOTES
Assumed care of PT A&O 4. Pt resting in bed with no signs of labored breathing. 6L O2. Tele monitor in place, cardiac rhythm being monitored. Call light within reach, bed in lowest position, upper bed rails up. Pt was updated on plan of care for the day. Will continue to monitor.

## 2020-03-28 NOTE — PROGRESS NOTES
Bedside report received from day nurse. Assumed care of patient. Pt. resting comfortably without any sign of distress. A&Ox4, VSS, no complaints at this time. POC reviewed and white board updated, Tele box on, Call light in reach, Bed locked in lowest position.

## 2020-03-28 NOTE — RESPIRATORY CARE
COPD EDUCATION by COPD CLINICAL EDUCATOR  3/28/2020 at 6:50 AM by Veena Roberson, RRT     Patient reviewed by COPD education team. Patient does not qualify for the COPD program.

## 2020-03-28 NOTE — PROGRESS NOTES
Pt resting in bed at this time. Even visible chest rise. No signs of distress. Call light in place. Bed in low and locked position.

## 2020-03-29 LAB
ANION GAP SERPL CALC-SCNC: 11 MMOL/L (ref 7–16)
ANION GAP SERPL CALC-SCNC: 11 MMOL/L (ref 7–16)
BUN SERPL-MCNC: 11 MG/DL (ref 8–22)
BUN SERPL-MCNC: 11 MG/DL (ref 8–22)
CALCIUM SERPL-MCNC: 7.6 MG/DL (ref 8.5–10.5)
CALCIUM SERPL-MCNC: 7.9 MG/DL (ref 8.5–10.5)
CHLORIDE SERPL-SCNC: 88 MMOL/L (ref 96–112)
CHLORIDE SERPL-SCNC: 88 MMOL/L (ref 96–112)
CO2 SERPL-SCNC: 37 MMOL/L (ref 20–33)
CO2 SERPL-SCNC: 38 MMOL/L (ref 20–33)
CREAT SERPL-MCNC: 0.44 MG/DL (ref 0.5–1.4)
CREAT SERPL-MCNC: 0.44 MG/DL (ref 0.5–1.4)
ERYTHROCYTE [DISTWIDTH] IN BLOOD BY AUTOMATED COUNT: 59.4 FL (ref 35.9–50)
GLUCOSE BLD-MCNC: 181 MG/DL (ref 65–99)
GLUCOSE BLD-MCNC: 241 MG/DL (ref 65–99)
GLUCOSE BLD-MCNC: 268 MG/DL (ref 65–99)
GLUCOSE BLD-MCNC: 302 MG/DL (ref 65–99)
GLUCOSE SERPL-MCNC: 225 MG/DL (ref 65–99)
GLUCOSE SERPL-MCNC: 235 MG/DL (ref 65–99)
HCT VFR BLD AUTO: 30.5 % (ref 42–52)
HGB BLD-MCNC: 10 G/DL (ref 14–18)
MCH RBC QN AUTO: 33.7 PG (ref 27–33)
MCHC RBC AUTO-ENTMCNC: 32.8 G/DL (ref 33.7–35.3)
MCV RBC AUTO: 102.7 FL (ref 81.4–97.8)
PLATELET # BLD AUTO: 181 K/UL (ref 164–446)
PMV BLD AUTO: 10.2 FL (ref 9–12.9)
POTASSIUM SERPL-SCNC: 2.6 MMOL/L (ref 3.6–5.5)
POTASSIUM SERPL-SCNC: 2.7 MMOL/L (ref 3.6–5.5)
POTASSIUM SERPL-SCNC: 2.9 MMOL/L (ref 3.6–5.5)
RBC # BLD AUTO: 2.97 M/UL (ref 4.7–6.1)
SODIUM SERPL-SCNC: 136 MMOL/L (ref 135–145)
SODIUM SERPL-SCNC: 137 MMOL/L (ref 135–145)
WBC # BLD AUTO: 7.2 K/UL (ref 4.8–10.8)

## 2020-03-29 PROCEDURE — 700111 HCHG RX REV CODE 636 W/ 250 OVERRIDE (IP): Performed by: HOSPITALIST

## 2020-03-29 PROCEDURE — A9270 NON-COVERED ITEM OR SERVICE: HCPCS | Performed by: HOSPITALIST

## 2020-03-29 PROCEDURE — 36415 COLL VENOUS BLD VENIPUNCTURE: CPT

## 2020-03-29 PROCEDURE — 700102 HCHG RX REV CODE 250 W/ 637 OVERRIDE(OP): Performed by: HOSPITALIST

## 2020-03-29 PROCEDURE — 80048 BASIC METABOLIC PNL TOTAL CA: CPT

## 2020-03-29 PROCEDURE — 770020 HCHG ROOM/CARE - TELE (206)

## 2020-03-29 PROCEDURE — 85027 COMPLETE CBC AUTOMATED: CPT

## 2020-03-29 PROCEDURE — 700111 HCHG RX REV CODE 636 W/ 250 OVERRIDE (IP): Performed by: INTERNAL MEDICINE

## 2020-03-29 PROCEDURE — A9270 NON-COVERED ITEM OR SERVICE: HCPCS | Performed by: INTERNAL MEDICINE

## 2020-03-29 PROCEDURE — 84132 ASSAY OF SERUM POTASSIUM: CPT

## 2020-03-29 PROCEDURE — 99232 SBSQ HOSP IP/OBS MODERATE 35: CPT | Performed by: HOSPITALIST

## 2020-03-29 PROCEDURE — 82962 GLUCOSE BLOOD TEST: CPT | Mod: 91

## 2020-03-29 PROCEDURE — 700102 HCHG RX REV CODE 250 W/ 637 OVERRIDE(OP): Performed by: INTERNAL MEDICINE

## 2020-03-29 RX ORDER — MAGNESIUM SULFATE 1 G/100ML
1 INJECTION INTRAVENOUS ONCE
Status: COMPLETED | OUTPATIENT
Start: 2020-03-29 | End: 2020-03-29

## 2020-03-29 RX ORDER — LABETALOL 200 MG/1
200 TABLET, FILM COATED ORAL TWICE DAILY
Status: DISCONTINUED | OUTPATIENT
Start: 2020-03-29 | End: 2020-03-30 | Stop reason: HOSPADM

## 2020-03-29 RX ORDER — POTASSIUM CHLORIDE 7.45 MG/ML
10 INJECTION INTRAVENOUS
Status: COMPLETED | OUTPATIENT
Start: 2020-03-29 | End: 2020-03-30

## 2020-03-29 RX ORDER — POTASSIUM CHLORIDE 20 MEQ/1
20 TABLET, EXTENDED RELEASE ORAL ONCE
Status: COMPLETED | OUTPATIENT
Start: 2020-03-29 | End: 2020-03-29

## 2020-03-29 RX ORDER — POTASSIUM CHLORIDE 20 MEQ/1
40 TABLET, EXTENDED RELEASE ORAL ONCE
Status: COMPLETED | OUTPATIENT
Start: 2020-03-29 | End: 2020-03-29

## 2020-03-29 RX ORDER — POTASSIUM CHLORIDE 20 MEQ/1
40 TABLET, EXTENDED RELEASE ORAL 3 TIMES DAILY
Status: DISCONTINUED | OUTPATIENT
Start: 2020-03-29 | End: 2020-03-30 | Stop reason: HOSPADM

## 2020-03-29 RX ORDER — POTASSIUM CHLORIDE 7.45 MG/ML
10 INJECTION INTRAVENOUS
Status: COMPLETED | OUTPATIENT
Start: 2020-03-29 | End: 2020-03-29

## 2020-03-29 RX ORDER — LABETALOL 200 MG/1
200 TABLET, FILM COATED ORAL ONCE
Status: COMPLETED | OUTPATIENT
Start: 2020-03-29 | End: 2020-03-29

## 2020-03-29 RX ADMIN — SPIRONOLACTONE 50 MG: 25 TABLET ORAL at 07:46

## 2020-03-29 RX ADMIN — POTASSIUM CHLORIDE 40 MEQ: 1500 TABLET, EXTENDED RELEASE ORAL at 11:35

## 2020-03-29 RX ADMIN — INSULIN HUMAN 3 UNITS: 100 INJECTION, SOLUTION PARENTERAL at 05:24

## 2020-03-29 RX ADMIN — LABETALOL HYDROCHLORIDE 200 MG: 200 TABLET, FILM COATED ORAL at 12:46

## 2020-03-29 RX ADMIN — MONTELUKAST 10 MG: 10 TABLET, FILM COATED ORAL at 21:47

## 2020-03-29 RX ADMIN — POTASSIUM CHLORIDE 20 MEQ: 1500 TABLET, EXTENDED RELEASE ORAL at 02:26

## 2020-03-29 RX ADMIN — LABETALOL HYDROCHLORIDE 200 MG: 200 TABLET, FILM COATED ORAL at 17:20

## 2020-03-29 RX ADMIN — FAMOTIDINE 20 MG: 20 TABLET ORAL at 05:29

## 2020-03-29 RX ADMIN — POTASSIUM CHLORIDE 40 MEQ: 1500 TABLET, EXTENDED RELEASE ORAL at 17:18

## 2020-03-29 RX ADMIN — INSULIN HUMAN 4 UNITS: 100 INJECTION, SOLUTION PARENTERAL at 00:24

## 2020-03-29 RX ADMIN — POTASSIUM CHLORIDE 10 MEQ: 10 INJECTION, SOLUTION INTRAVENOUS at 06:00

## 2020-03-29 RX ADMIN — FAMOTIDINE 20 MG: 20 TABLET ORAL at 17:18

## 2020-03-29 RX ADMIN — Medication 400 MG: at 23:37

## 2020-03-29 RX ADMIN — ENALAPRILAT 1.25 MG: 1.25 INJECTION INTRAVENOUS at 07:46

## 2020-03-29 RX ADMIN — MAGNESIUM SULFATE IN DEXTROSE 1 G: 10 INJECTION, SOLUTION INTRAVENOUS at 02:15

## 2020-03-29 RX ADMIN — INSULIN HUMAN 24 UNITS: 100 INJECTION, SUSPENSION SUBCUTANEOUS at 05:24

## 2020-03-29 RX ADMIN — POTASSIUM CHLORIDE 10 MEQ: 10 INJECTION, SOLUTION INTRAVENOUS at 06:30

## 2020-03-29 RX ADMIN — POTASSIUM CHLORIDE 10 MEQ: 10 INJECTION, SOLUTION INTRAVENOUS at 03:34

## 2020-03-29 RX ADMIN — HYDRALAZINE HYDROCHLORIDE 25 MG: 25 TABLET, FILM COATED ORAL at 03:32

## 2020-03-29 RX ADMIN — POTASSIUM CHLORIDE 20 MEQ: 1500 TABLET, EXTENDED RELEASE ORAL at 05:31

## 2020-03-29 RX ADMIN — INSULIN HUMAN 7 UNITS: 100 INJECTION, SOLUTION PARENTERAL at 17:06

## 2020-03-29 RX ADMIN — BUDESONIDE AND FORMOTEROL FUMARATE DIHYDRATE 2 PUFF: 160; 4.5 AEROSOL RESPIRATORY (INHALATION) at 05:16

## 2020-03-29 RX ADMIN — INSULIN HUMAN 20 UNITS: 100 INJECTION, SUSPENSION SUBCUTANEOUS at 17:05

## 2020-03-29 RX ADMIN — POTASSIUM CHLORIDE 10 MEQ: 7.46 INJECTION, SOLUTION INTRAVENOUS at 23:58

## 2020-03-29 RX ADMIN — INSULIN HUMAN 7 UNITS: 100 INJECTION, SOLUTION PARENTERAL at 23:53

## 2020-03-29 RX ADMIN — SIMVASTATIN 20 MG: 20 TABLET, FILM COATED ORAL at 17:18

## 2020-03-29 RX ADMIN — AMLODIPINE BESYLATE 10 MG: 10 TABLET ORAL at 05:30

## 2020-03-29 RX ADMIN — ENOXAPARIN SODIUM 40 MG: 100 INJECTION SUBCUTANEOUS at 17:19

## 2020-03-29 RX ADMIN — LISINOPRIL 40 MG: 20 TABLET ORAL at 05:29

## 2020-03-29 RX ADMIN — METFORMIN HYDROCHLORIDE 1000 MG: 500 TABLET ORAL at 17:19

## 2020-03-29 RX ADMIN — POTASSIUM CHLORIDE 10 MEQ: 10 INJECTION, SOLUTION INTRAVENOUS at 02:26

## 2020-03-29 RX ADMIN — SPIRONOLACTONE 50 MG: 25 TABLET ORAL at 17:19

## 2020-03-29 RX ADMIN — INSULIN HUMAN 10 UNITS: 100 INJECTION, SOLUTION PARENTERAL at 11:24

## 2020-03-29 RX ADMIN — POTASSIUM CHLORIDE 40 MEQ: 1500 TABLET, EXTENDED RELEASE ORAL at 23:37

## 2020-03-29 RX ADMIN — BUDESONIDE AND FORMOTEROL FUMARATE DIHYDRATE 2 PUFF: 160; 4.5 AEROSOL RESPIRATORY (INHALATION) at 17:20

## 2020-03-29 ASSESSMENT — ENCOUNTER SYMPTOMS
FEVER: 0
SHORTNESS OF BREATH: 0
BLURRED VISION: 0
DIZZINESS: 0

## 2020-03-29 ASSESSMENT — FIBROSIS 4 INDEX: FIB4 SCORE: 1.21

## 2020-03-29 NOTE — PROGRESS NOTES
Tech from Lab called with critical result of potasium at 2.7. Critical lab result read back to tech.   Dr. Alice Plata notified of critical lab result.  Critical lab result read back by Dr. Alice Plata. Orders were given, will continue to monitor.

## 2020-03-29 NOTE — CARE PLAN
Problem: Communication  Goal: The ability to communicate needs accurately and effectively will improve  Outcome: PROGRESSING AS EXPECTED  Intervention: Educate patient and significant other/support system about the plan of care, procedures, treatments, medications and allow for questions  Flowsheets (Taken 3/29/2020 1038)  Pt & Family Have Been Educated on Methods Available to Report Concerns Related to Care, Treatment, Services, and Patient Safety Issues: Yes     Problem: Bowel/Gastric:  Goal: Normal bowel function is maintained or improved  Outcome: PROGRESSING AS EXPECTED

## 2020-03-29 NOTE — PROGRESS NOTES
Hospital Medicine Daily Progress Note    Date of Service  3/29/2020    Chief Complaint  59 y.o. male admitted 3/26/2020 with abnormal labs    Hospital Course    Mr. Pino is a 59 y.o. male who presented 3/26/2020 with swelling of his lower extremities Mr. Pino has a past medical history of recently diagnosed small cell lung cancer, diabetes mellitus, hypertension, and COPD that apparently was referred from his primary care provider, Dr. Peoples, to the emergency room in [facility] due to low potassium levels that were followed up as an outpatient.  Currently he is also been struggling with his diabetes and is seen an endocrinologist though sugars have been in the 300-500 range.  Also notable, by records from [facility], is that he has had significant swelling for quite some time.  He presented the emergency room with these complaints there was given potassium as well as Lasix given his marked hypokalemia of 2.3 and significant anasarca.  His blood sugars there were in the mid 300 range and he was given insulin.  He was being admitted to the hospital in [place] and the decision was made to transfer him for higher level of care to our intensive care unit given his refractory hypokalemia.       Interval Problem Update  3/27: patient seen and evaluated in the ICU.  Patient remains in overall quite poor historian.  I discussed with the medication reconciliation technician and they will try obtain his home medications.  He will be taken off of the IV potassium scale and placed on oral potassium.  Palliative care has consulted.  3/28: Mr. Pino was evaluated and examined on the tele floor.  Patient is much more lucid today and is able to convey that he is on 4 L of oxygen at home.  He states he is due for his chemotherapy next week Monday, Tuesday, and Wednesday in [facility].  His potassium remains quite low and his blood pressure is quite high.  I discussed with his nurse as well as the pharmacist,  Donna on the telemetry floor about how we are going to add Aldactone to his regimen given the persistent hypokalemia.  3/29: Patient seen and evaluated on telemetry floor.  Yesterday Aldactone was added to lisinopril and he was continued on 3 times a day potassium supplementation despite that his potassium is dropped down to 2.6 today.  Remains on continuous telemetry monitoring.  His blood pressure remains quite high at 186/94.  Consultants/Specialty  Critical care.   Palliative care    Code Status  Full code by default    Disposition  tele    Review of Systems  Review of Systems   Constitutional: Negative for fever.   Eyes: Negative for blurred vision.   Respiratory: Negative for shortness of breath.    Cardiovascular: Positive for leg swelling. Negative for chest pain.   Gastrointestinal:        He is tolerating a diet well   Neurological: Negative for dizziness.   All other systems reviewed and are negative.       Physical Exam  Temp:  [36.6 °C (97.8 °F)-37.3 °C (99.1 °F)] 36.8 °C (98.2 °F)  Pulse:  [64-70] 64  Resp:  [18] 18  BP: (164-186)/(79-94) 186/94  SpO2:  [93 %-100 %] 93 %    Physical Exam  Vitals signs and nursing note reviewed.   Constitutional:       General: He is not in acute distress.     Appearance: Normal appearance. He is obese. He is not ill-appearing.   HENT:      Mouth/Throat:      Mouth: Mucous membranes are dry.      Pharynx: Oropharynx is clear.   Eyes:      General: No scleral icterus.     Conjunctiva/sclera: Conjunctivae normal.   Neck:      Musculoskeletal: Normal range of motion and neck supple.   Cardiovascular:      Rate and Rhythm: Normal rate and regular rhythm.      Heart sounds: No murmur.   Pulmonary:      Effort: No respiratory distress.      Breath sounds: Normal breath sounds.      Comments: moderate air movement without rhonchi and no wheezing  Abdominal:      General: There is no distension.      Tenderness: There is no abdominal tenderness.   Musculoskeletal:      Right  lower leg: Edema present.      Left lower leg: Edema present.      Comments: 3+ edema bilat  +pitting   Skin:     General: Skin is warm and dry.      Comments: Multiple scratches on his right shin   Neurological:      General: No focal deficit present.      Mental Status: He is alert and oriented to person, place, and time.      Sensory: No sensory deficit.      Motor: No weakness.   Psychiatric:         Mood and Affect: Mood normal.         Behavior: Behavior normal.         Fluids    Intake/Output Summary (Last 24 hours) at 3/29/2020 0748  Last data filed at 3/28/2020 1600  Gross per 24 hour   Intake 480 ml   Output --   Net 480 ml       Laboratory  Recent Labs     03/27/20  0503 03/28/20  0111 03/29/20  0301   WBC 5.2 6.0 7.2   RBC 3.17* 3.11* 2.97*   HEMOGLOBIN 10.7* 10.3* 10.0*   HEMATOCRIT 33.0* 32.2* 30.5*   .1* 103.5* 102.7*   MCH 33.8* 33.1* 33.7*   MCHC 32.4* 32.0* 32.8*   RDW 59.9* 59.9* 59.4*   PLATELETCT 122* 165 181   MPV 9.9 9.9 10.2     Recent Labs     03/28/20  0111 03/29/20  0019 03/29/20  0301   SODIUM 140 137 136   POTASSIUM 3.0* 2.7* 2.6*   CHLORIDE 89* 88* 88*   CO2 40* 38* 37*   GLUCOSE 410* 235* 225*   BUN 15 11 11   CREATININE 0.51 0.44* 0.44*   CALCIUM 7.7* 7.9* 7.6*     Recent Labs     03/26/20  0813   APTT 20.9*   INR 1.01               Imaging  DX-CHEST-PORTABLE (1 VIEW)   Final Result         Diffuse interstitial prominence could be seen with edema, atypical infection or other chronic changes.      EC-ECHOCARDIOGRAM COMPLETE W/O CONT   Final Result      US-EXTREMITY VENOUS LOWER BILAT   Final Result           Assessment/Plan  * Hypokalemia- (present on admission)  Assessment & Plan  Severe hypokalemia was refractory to treatment may have been potentiated by IV Lasix  He has been given aggressive IV and oral potassium and his K remains low at 2.6  He has been restarted back on his home lisinopril 40 mg and KCl 20 mEq TID has been added and will increase to 40 mEq TID  Added  aldactone 50 mg BID for potassium-sparing effects as well as diuresis  Continuous telemetry monitoring to follow for possible arrhythmias  BMP ordered for the morning    Small cell carcinoma of lung (HCC)- (present on admission)  Assessment & Plan  Recently diagnosed cancer followed by  in [facility]  He is due for chemotherapy on Monday but will have to be pushed back a day or two  Palliative Care consultation    COPD (chronic obstructive pulmonary disease) (HCC)- (present on admission)  Assessment & Plan  History of  Patient states he is on 4 liters at home and is on 5 liters here    Pedal edema- (present on admission)  Assessment & Plan  Severe peripheral edema consistent with anasarca  Echocardiogram reveals normal EF and does not mention diastolic dysfunction  Refrain from Lasix until his potassium comes up and add aldactone  Added MEL hose    Uncontrolled type 2 diabetes mellitus with hyperglycemia (HCC)- (present on admission)  Assessment & Plan  Blood sugar upon presentation emergency room in [facility] 389  Restart home insulin 70/30  Blood sugars have been quite high thus supplemental sliding scale has been used  Glycohemoglobin high at 10.2    Abnormal LFTs- (present on admission)  Assessment & Plan  Consider hepatic congestion though await his echocardiogram  Consider non-alcoholic fatty liver disease  We do not have a prior reference range of liver enzymes  He does not have any abdominal tenderness    Pancytopenia (HCC)- (present on admission)  Assessment & Plan  This is in the setting of lung cancer it is conceivable that he is on chemotherapy though patient cannot at this time give a history  We will try and obtain records from [facility]  We do not have a prior reference range    Essential hypertension- (present on admission)  Assessment & Plan  History of  He was restarted back on his home Lisinopril 40 mg and Norvasc 10 mg but his blood pressure remains quite  high  Added aldactone 50 mg BID and his blood pressure is still high thus add labetalol 200 mg BID       VTE prophylaxis: lovenox

## 2020-03-29 NOTE — PROGRESS NOTES
Report received from night shift RN, assumed care of pt. Pt A&O4 , in no apparent distress, reports 0 pain. Plan of care discussed with pt, labs and chart reviewed. All needs met at this time. Tele box on, pt in chair. Call light within reach, bed locked and in lowest position. All fall precautions and hourly rounding in place. Will continue to monitor.

## 2020-03-30 ENCOUNTER — PATIENT OUTREACH (OUTPATIENT)
Dept: HEALTH INFORMATION MANAGEMENT | Facility: OTHER | Age: 60
End: 2020-03-30

## 2020-03-30 VITALS
SYSTOLIC BLOOD PRESSURE: 147 MMHG | TEMPERATURE: 97.5 F | HEIGHT: 69 IN | DIASTOLIC BLOOD PRESSURE: 83 MMHG | HEART RATE: 72 BPM | OXYGEN SATURATION: 96 % | WEIGHT: 215.17 LBS | BODY MASS INDEX: 31.87 KG/M2 | RESPIRATION RATE: 18 BRPM

## 2020-03-30 PROBLEM — E87.3 METABOLIC ALKALOSIS: Status: RESOLVED | Noted: 2020-03-26 | Resolved: 2020-03-30

## 2020-03-30 LAB
ANION GAP SERPL CALC-SCNC: 9 MMOL/L (ref 7–16)
BUN SERPL-MCNC: 12 MG/DL (ref 8–22)
CALCIUM SERPL-MCNC: 7.6 MG/DL (ref 8.5–10.5)
CHLORIDE SERPL-SCNC: 90 MMOL/L (ref 96–112)
CO2 SERPL-SCNC: 37 MMOL/L (ref 20–33)
CREAT SERPL-MCNC: 0.52 MG/DL (ref 0.5–1.4)
ERYTHROCYTE [DISTWIDTH] IN BLOOD BY AUTOMATED COUNT: 60.9 FL (ref 35.9–50)
GLUCOSE BLD-MCNC: 262 MG/DL (ref 65–99)
GLUCOSE BLD-MCNC: 267 MG/DL (ref 65–99)
GLUCOSE SERPL-MCNC: 316 MG/DL (ref 65–99)
HCT VFR BLD AUTO: 30.8 % (ref 42–52)
HGB BLD-MCNC: 10 G/DL (ref 14–18)
MCH RBC QN AUTO: 33.4 PG (ref 27–33)
MCHC RBC AUTO-ENTMCNC: 32.5 G/DL (ref 33.7–35.3)
MCV RBC AUTO: 103 FL (ref 81.4–97.8)
PLATELET # BLD AUTO: 181 K/UL (ref 164–446)
PMV BLD AUTO: 10.2 FL (ref 9–12.9)
POTASSIUM SERPL-SCNC: 3.5 MMOL/L (ref 3.6–5.5)
RBC # BLD AUTO: 2.99 M/UL (ref 4.7–6.1)
SODIUM SERPL-SCNC: 136 MMOL/L (ref 135–145)
WBC # BLD AUTO: 8.7 K/UL (ref 4.8–10.8)

## 2020-03-30 PROCEDURE — 36415 COLL VENOUS BLD VENIPUNCTURE: CPT

## 2020-03-30 PROCEDURE — 700101 HCHG RX REV CODE 250: Performed by: INTERNAL MEDICINE

## 2020-03-30 PROCEDURE — 99239 HOSP IP/OBS DSCHRG MGMT >30: CPT | Performed by: HOSPITALIST

## 2020-03-30 PROCEDURE — 94760 N-INVAS EAR/PLS OXIMETRY 1: CPT

## 2020-03-30 PROCEDURE — A9270 NON-COVERED ITEM OR SERVICE: HCPCS | Performed by: INTERNAL MEDICINE

## 2020-03-30 PROCEDURE — 80048 BASIC METABOLIC PNL TOTAL CA: CPT

## 2020-03-30 PROCEDURE — A9270 NON-COVERED ITEM OR SERVICE: HCPCS | Performed by: HOSPITALIST

## 2020-03-30 PROCEDURE — 700111 HCHG RX REV CODE 636 W/ 250 OVERRIDE (IP): Performed by: HOSPITALIST

## 2020-03-30 PROCEDURE — 700102 HCHG RX REV CODE 250 W/ 637 OVERRIDE(OP): Performed by: HOSPITALIST

## 2020-03-30 PROCEDURE — 82962 GLUCOSE BLOOD TEST: CPT

## 2020-03-30 PROCEDURE — 85027 COMPLETE CBC AUTOMATED: CPT

## 2020-03-30 PROCEDURE — 700102 HCHG RX REV CODE 250 W/ 637 OVERRIDE(OP): Performed by: INTERNAL MEDICINE

## 2020-03-30 PROCEDURE — 94640 AIRWAY INHALATION TREATMENT: CPT

## 2020-03-30 RX ORDER — SPIRONOLACTONE 50 MG/1
50 TABLET, FILM COATED ORAL 2 TIMES DAILY
Qty: 60 TAB | Refills: 0 | Status: SHIPPED | OUTPATIENT
Start: 2020-03-30

## 2020-03-30 RX ADMIN — INSULIN HUMAN 24 UNITS: 100 INJECTION, SUSPENSION SUBCUTANEOUS at 06:26

## 2020-03-30 RX ADMIN — LABETALOL HYDROCHLORIDE 200 MG: 200 TABLET, FILM COATED ORAL at 06:20

## 2020-03-30 RX ADMIN — LABETALOL HYDROCHLORIDE 10 MG: 5 INJECTION, SOLUTION INTRAVENOUS at 00:51

## 2020-03-30 RX ADMIN — IPRATROPIUM BROMIDE AND ALBUTEROL SULFATE 3 ML: .5; 3 SOLUTION RESPIRATORY (INHALATION) at 00:17

## 2020-03-30 RX ADMIN — INSULIN HUMAN 7 UNITS: 100 INJECTION, SOLUTION PARENTERAL at 06:25

## 2020-03-30 RX ADMIN — BUDESONIDE AND FORMOTEROL FUMARATE DIHYDRATE 2 PUFF: 160; 4.5 AEROSOL RESPIRATORY (INHALATION) at 06:22

## 2020-03-30 RX ADMIN — AMLODIPINE BESYLATE 10 MG: 10 TABLET ORAL at 06:20

## 2020-03-30 RX ADMIN — SPIRONOLACTONE 50 MG: 25 TABLET ORAL at 06:22

## 2020-03-30 RX ADMIN — METFORMIN HYDROCHLORIDE 1000 MG: 500 TABLET ORAL at 06:21

## 2020-03-30 RX ADMIN — POTASSIUM CHLORIDE 10 MEQ: 7.46 INJECTION, SOLUTION INTRAVENOUS at 01:57

## 2020-03-30 RX ADMIN — POTASSIUM CHLORIDE 40 MEQ: 1500 TABLET, EXTENDED RELEASE ORAL at 06:21

## 2020-03-30 RX ADMIN — FAMOTIDINE 20 MG: 20 TABLET ORAL at 06:20

## 2020-03-30 RX ADMIN — LISINOPRIL 40 MG: 20 TABLET ORAL at 06:20

## 2020-03-30 ASSESSMENT — PATIENT HEALTH QUESTIONNAIRE - PHQ9
2. FEELING DOWN, DEPRESSED, IRRITABLE, OR HOPELESS: NOT AT ALL
SUM OF ALL RESPONSES TO PHQ9 QUESTIONS 1 AND 2: 0
1. LITTLE INTEREST OR PLEASURE IN DOING THINGS: NOT AT ALL

## 2020-03-30 NOTE — DISCHARGE INSTRUCTIONS
Discharge Instructions per Fidel Ca M.D.    Please go to the pharmacy today and make the medication changes noted--no further potassium nor furosemide and now take spironolactone.   Please call Dr. Murray's office today [phone] to schedule chemotherapy tomorrow    DIET: diabetic    ACTIVITY: plenty of exercise    DIAGNOSIS: hypokalemia    Return to ER if symptoms worsen    Discharge Instructions    Discharged to home by medical transportation with self. Discharged via wheelchair, hospital escort: Yes.  Special equipment needed: Oxygen    Be sure to schedule a follow-up appointment with your primary care doctor or any specialists as instructed.     Discharge Plan:   Diet Plan: Discussed  Activity Level: Discussed  Confirmed Follow up Appointment: Patient to Call and Schedule Appointment  Confirmed Symptoms Management: Discussed  Medication Reconciliation Updated: Yes  Influenza Vaccine Indication: Not indicated: Previously immunized this influenza season and > 8 years of age    I understand that a diet low in cholesterol, fat, and sodium is recommended for good health. Unless I have been given specific instructions below for another diet, I accept this instruction as my diet prescription.   Other diet:     Special Instructions: None    · Is patient discharged on Warfarin / Coumadin?   No     Depression / Suicide Risk    As you are discharged from this [facility] facility, it is important to learn how to keep safe from harming yourself.    Recognize the warning signs:  · Abrupt changes in personality, positive or negative- including increase in energy   · Giving away possessions  · Change in eating patterns- significant weight changes-  positive or negative  · Change in sleeping patterns- unable to sleep or sleeping all the time   · Unwillingness or inability to communicate  · Depression  · Unusual sadness, discouragement and loneliness  · Talk of wanting to die  · Neglect of personal  appearance   · Rebelliousness- reckless behavior  · Withdrawal from people/activities they love  · Confusion- inability to concentrate     If you or a loved one observes any of these behaviors or has concerns about self-harm, here's what you can do:  · Talk about it- your feelings and reasons for harming yourself  · Remove any means that you might use to hurt yourself (examples: pills, rope, extension cords, firearm)  · Get professional help from the community (Mental Health, Substance Abuse, psychological counseling)  · Do not be alone:Call your Safe Contact- someone whom you trust who will be there for you.  · Call your local CRISIS HOTLINE 628-6411 or 756-264-7499  · Call your local Children's Mobile Crisis Response Team [place] (698) 716-3183 or www.[employer]  · Call the toll free National Suicide Prevention Hotlines   · National Suicide Prevention Lifeline 244-850-AYVC (6355)  · [employer] 800-SUICIDE (682-1770)      Hypokalemia  Hypokalemia means a low potassium level in the blood. Symptoms may include muscle weakness and cramping, fatigue, abdominal pain, vomiting, constipation, or irregularities of the heartbeat. Sometimes hypokalemia is discovered by your caregiver if you are taking certain medicines for high blood pressure or kidney disease.   Potassium is an electrolyte that helps regulate the amount of fluid in the body. It also stimulates muscle contraction and maintains a stable acid-base balance. If potassium levels go too low or too high, your health may be in danger. You are at risk for developing shock, heart, and lung problems. Hypokalemia can occur if you have excessive diarrhea, vomiting, or sweating. Potassium can be lost through your kidneys in the urine. Certain common medicines can also cause potassium loss, especially water pills (diuretics). The same is possible with cortisone medications or certain types of antibiotics. Low potassium can be dangerous if you  are taking certain heart medicines. In diabetes, your potassium may fall after you take insulin, especially if your diabetes had been out of control for a while. In rare cases, potassium may be low because you are not getting enough in your diet.   In adults, a potassium level below 3.5 mEq/L is usually considered low.  Hypokalemia can be treated with potassium supplements taken by mouth and a diet that is high in potassium. Foods with high potassium content are:  · Peas, lentils, lima beans, nuts, and dried fruit.   · Whole grain and bran cereals and breads.   · Fresh fruit and vegetables. Examples include:   · Bananas.   · Cantaloupe.   · Grapefruit.   · Oranges.   · Tomatoes.   · Honeydew melons.   · Potatoes.   · Peaches.   · Orange and tomato juices.   · Meats.   See your caregiver as instructed for a follow-up blood test to be sure your potassium is back to normal.  SEEK MEDICAL CARE IF:   · You have nausea, vomiting, constipation, or abdominal pain.   · You have palpitations or irregular heartbeats, chest pain or shortness of breath.   · You have muscle cramps or weakness or fatigue.   · You have lethargy.   SEEK IMMEDIATE MEDICAL CARE IF:   · You have paralysis.   · You have confusion or other mental status changes.   Document Released: 12/18/2006 Document Revised: 03/11/2013 Document Reviewed: 04/13/2011  [employer]® Patient Information ©2013 [employer].

## 2020-03-30 NOTE — DISCHARGE SUMMARY
Discharge Summary    CHIEF COMPLAINT ON ADMISSION  No chief complaint on file.      Reason for Admission  Severe Hypokalemia     Admission Date  3/26/2020    CODE STATUS  Full Code    HPI & HOSPITAL COURSE  This is a 59 y.o. male here with low potassium    Mr. Pino is a 59 y.o. male who presented 3/26/2020 with swelling of his lower extremities Mr. Pino has a past medical history of recently diagnosed small cell lung cancer, diabetes mellitus, hypertension, and COPD that apparently was referred from his primary care provider, Dr. Peoples, to the emergency room in [facility] due to low potassium levels that were followed up as an outpatient.  Currently he is also been struggling with his diabetes and is seen an endocrinologist though sugars have been in the 300-500 range.  Also notable, by records from [facility], is that he has had significant swelling for quite some time.  He presented the emergency room with these complaints there was given potassium as well as Lasix given his marked hypokalemia of 2.3 and significant anasarca.  His blood sugars there were in the mid 300 range and he was given insulin.  He was being admitted to the hospital in [place] and the decision was made to transfer him for higher level of care to our intensive care unit given his refractory hypokalemia.    He was given aggressive IV as well as oral potassium.  His potassium remained low therefore Aldactone was added to his lisinopril.  He was monitored on telemetry and had no arrhythmias.  His blood pressure was markedly elevated therefore he was restarted back on his home regimen.  Today, the combination of his home lisinopril 40 mg daily, Aldactone 50 mg twice a day, and potassium chloride 40 mEq 3 times a day and his potassium is up to 3.5.  He will be discharged home today and asked him not to take the Lasix he had been on previously and not to take that potassium had been on previously and only the lisinopril and  Aldactone.  I called Dr. Murray, his oncologist in [place], and we discussed his condition at length.  His wife is also Mr. Pino's endocrinologist which was very fortunate. Dr. Murray will arrange for him to have chemotherapy tomorrow and they will check a basic metabolic panel prior to chemo.  Both he and his wife will keep an eye on his potassium levels.  Arrangements are being made for him to be transferred back to [facility] where he lives on his usual 4 L of oxygen.    Therefore, he is discharged in fair and stable condition to home with close outpatient follow-up.    The patient met 2-midnight criteria for an inpatient stay at the time of discharge.    Discharge Date  3/30    FOLLOW UP ITEMS POST DISCHARGE  Chemotherapy tomorrow at [facility]    DISCHARGE DIAGNOSES  Principal Problem:    Hypokalemia POA: Yes  Active Problems:    Small cell carcinoma of lung (HCC) POA: Yes    Essential hypertension POA: Yes    Pancytopenia (HCC) POA: Yes    Abnormal LFTs POA: Yes    Uncontrolled type 2 diabetes mellitus with hyperglycemia (HCC) POA: Yes    Pedal edema POA: Yes    COPD (chronic obstructive pulmonary disease) (HCC) POA: Yes  Resolved Problems:    Metabolic alkalosis POA: Unknown      FOLLOW UP  No future appointments.  Jamir Murray M.D.  [id number] [facility] [id number] [phone]    In 1 day        MEDICATIONS ON DISCHARGE     Medication List      START taking these medications      Instructions   spironolactone 50 MG Tabs  Commonly known as:  ALDACTONE   Take 1 Tab by mouth 2 Times a Day.  Dose:  50 mg        CONTINUE taking these medications      Instructions   amLODIPine 10 MG Tabs  Commonly known as:  NORVASC   Take 10 mg by mouth every day.  Dose:  10 mg     famotidine 20 MG Tabs  Commonly known as:  PEPCID   Take 20 mg by mouth 2 times a day.  Dose:  20 mg     Insulin Aspart Prot & Aspart (70-30) 100 UNIT/ML Supn   Inject 20-24 Units as instructed 2 Times a Day. 24 units in  am and 20 units in pm  Dose:  20-24 Units     lisinopril 40 MG tablet  Commonly known as:  PRINIVIL   Take 40 mg by mouth every day.  Dose:  40 mg     metFORMIN 500 MG Tabs  Commonly known as:  GLUCOPHAGE   Take 500 mg by mouth 2 times a day, with meals.  Dose:  500 mg     metoprolol 100 MG Tabs  Commonly known as:  LOPRESSOR   Take 100 mg by mouth 2 times a day.  Dose:  100 mg     ondansetron 8 MG Tbdp  Commonly known as:  ZOFRAN ODT   Take 8 mg by mouth every 6 hours as needed for Nausea.  Dose:  8 mg     Wixela Inhub 250-50 MCG/DOSE Aepb  Generic drug:  fluticasone-salmeterol   Inhale 1 Puff by mouth 2 times a day.  Dose:  1 Puff        STOP taking these medications    furosemide 40 MG Tabs  Commonly known as:  LASIX     potassium chloride SA 10 MEQ Tbcr  Commonly known as:  K-DUR            Allergies  Allergies   Allergen Reactions   • Penicillins Anaphylaxis and Swelling     Facial swelling  RXN =age 40         DIET  Orders Placed This Encounter   Procedures   • Diet Order Diabetic     Standing Status:   Standing     Number of Occurrences:   1     Order Specific Question:   Diet:     Answer:   Diabetic [3]       ACTIVITY  Plenty of exercise  CONSULTATIONS  Critical care    PROCEDURES  none    LABORATORY  Lab Results   Component Value Date    SODIUM 136 03/30/2020    POTASSIUM 3.5 (L) 03/30/2020    CHLORIDE 90 (L) 03/30/2020    CO2 37 (H) 03/30/2020    GLUCOSE 316 (H) 03/30/2020    BUN 12 03/30/2020    CREATININE 0.52 03/30/2020        Lab Results   Component Value Date    WBC 8.7 03/30/2020    HEMOGLOBIN 10.0 (L) 03/30/2020    HEMATOCRIT 30.8 (L) 03/30/2020    PLATELETCT 181 03/30/2020    echocardiogram:     CONCLUSIONS  Normal left ventricular systolic function.  Left ventricular ejection fraction is visually estimated to be 65%.  Mild concentric left ventricular hypertrophy.  Aortic sclerosis without stenosis.  Mild mitral regurgitation.  No prior study is available for comparison    Total time of the  discharge process exceeds 32 minutes.

## 2020-03-30 NOTE — PROGRESS NOTES
Report received from night shift RN, assumed care of pt. Pt A&O4, in no apparent distress, reports 0 pain. Plan of care discussed with pt, labs and chart reviewed. All needs met at this time. Tele box on, pt in bed. Call light within reach, bed locked and in lowest position. All fall precautions and hourly rounding in place. Will continue to monitor.

## 2020-03-30 NOTE — PROGRESS NOTES
Pt. A&O x4. Given discharge instructions, discussed diet, activity, follow up appointments, symptoms & management, and prescriptions sent to preferred pharmacy. Packet sent with patient, IV d/c'd, tele box off and all questions answered. Pt transported via wheelchair to [facility] with belongings.

## 2020-03-30 NOTE — DISCHARGE PLANNING
Care Transition Team Assessment    Information Source  Orientation : Oriented x 4  Information Given By: Patient     This RN Case Manager spoke with the patient via bedside telephone and obtained the information used in this assessment. Patient verified accuracy of facesheet. Patient lives alone in a RV. [facility] at [address] [place] [address] in [place], [place]. Prior to current hospitalization, patient was completely independent with ADLS/IADLS on home oxygen. Patient uses [employer]. Patient drives his car to and from his doctors appointments. Patient completed an advanced directive and is scanned into EPIC. Patient's discharge plan is home when medically cleared.    Patient was transported from [facility] and he needs a ride back there to retrieve his car in which his oxygen tanks are currently located. He is supposed to start chemotherapy today at [facility] in [place], [place]. This RN Case Manager is working on arranging a ride and oxygen for discharge.       Contacted [facility] at [phone] and confirmed patient is an established client. [facility] to deliver oxygen tanks to hospital for discharge.    Called [facility] at [phone]. Patient lives in [facility] and is not a recepient of this benefit. Called [facility] at [phone] and left a voicemail for a return call. Called the phone number for the emergency contact listed on the face sheet with no answer. Arranged for [facility] to take the patient to [facility]. Bedside RN David notified to call Xena at [phone] as patient is being wheeled of the unit for ID of [employer] .     Elopement Risk  Legal Hold: No  Ambulatory or Self Mobile in Wheelchair: Yes  Disoriented: No  Psychiatric Symptoms: None  History of Wandering: No  Elopement this Admit: No  Vocalizing Wanting to Leave: No  Displays Behaviors, Body Language Wanting to Leave: No-Not at Risk for Elopement  Elopement Risk: Not at Risk  for Elopement    Interdisciplinary Discharge Planning  Primary Care Physician: Janine Peoples MD  Lives with - Patient's Self Care Capacity: Alone and Able to Care For Self  Patient or legal guardian wants to designate a caregiver (see row info): No  Support Systems: Friends / Neighbors  Housing / Facility: Motor Home  Patient Expects to be Discharged to:: home  Assistance Needed: Yes  Durable Medical Equipment: Home Oxygen  [phone]     Finances  Financial Barriers to Discharge: No  Prescription Coverage: Yes    Vision / Hearing Impairment  Vision Impairment : No  Hearing Impairment : No    Advance Directive  Advance Directive?: DPOA for Health Care    Domestic Abuse  Have you ever been the victim of abuse or violence?: No  Physical Abuse or Sexual Abuse: No  Verbal Abuse or Emotional Abuse: No  Possible Abuse Reported to:: Not Applicable     Discharge Risks or Barriers  Discharge risks or barriers?: Transportation  Patient risk factors: Complex medical needs    Anticipated Discharge Information  Anticipated discharge disposition: Home  Discharge Address: [facility]  Discharge Contact Phone Number: [phone]

## 2020-03-31 ENCOUNTER — PATIENT OUTREACH (OUTPATIENT)
Dept: HEALTH INFORMATION MANAGEMENT | Facility: OTHER | Age: 60
End: 2020-03-31

## 2020-04-01 ENCOUNTER — PATIENT OUTREACH (OUTPATIENT)
Dept: HEALTH INFORMATION MANAGEMENT | Facility: OTHER | Age: 60
End: 2020-04-01

## 2020-04-01 SDOH — ECONOMIC STABILITY: FOOD INSECURITY: WITHIN THE PAST 12 MONTHS, YOU WORRIED THAT YOUR FOOD WOULD RUN OUT BEFORE YOU GOT MONEY TO BUY MORE.: SOMETIMES TRUE

## 2020-04-01 SDOH — ECONOMIC STABILITY: FOOD INSECURITY: WITHIN THE PAST 12 MONTHS, THE FOOD YOU BOUGHT JUST DIDN'T LAST AND YOU DIDN'T HAVE MONEY TO GET MORE.: NEVER TRUE

## 2020-04-01 SDOH — ECONOMIC STABILITY: INCOME INSECURITY: HOW HARD IS IT FOR YOU TO PAY FOR THE VERY BASICS LIKE FOOD, HOUSING, MEDICAL CARE, AND HEATING?: SOMEWHAT HARD

## 2020-04-01 SDOH — ECONOMIC STABILITY: TRANSPORTATION INSECURITY
IN THE PAST 12 MONTHS, HAS THE LACK OF TRANSPORTATION KEPT YOU FROM MEDICAL APPOINTMENTS OR FROM GETTING MEDICATIONS?: NO

## 2020-04-01 SDOH — ECONOMIC STABILITY: TRANSPORTATION INSECURITY
IN THE PAST 12 MONTHS, HAS LACK OF TRANSPORTATION KEPT YOU FROM MEETINGS, WORK, OR FROM GETTING THINGS NEEDED FOR DAILY LIVING?: NO

## 2020-04-01 NOTE — PROGRESS NOTES
"CHW Zoe spoke with pt via mobile phone. Pt states that he has a PCP in [place], [place] and can make his own appts. Pt reports having trouble paying for resources such as food and housing but \"borrows money from his dad for everything\". Pt recently started collecting SSI and is aware of food assistance resources in [place]. Pt reports he has a good support system and lives alone in a trailer but says \"I have not told anyone I have cancer yet\". Pt stated that he is not sure if he is confident in managing his own health due to recent cancer diagnosis. Pt would like to be connected to [facility] RN.     Community Health Worker Intake  • Social determinates of health intake completed  • Identified barriers to none  • Contact information provided to Krunal  • PCP = Janine Peoples  • Referral to RN     Plan:   I will refer pt over to [facility].     "

## 2020-04-06 ENCOUNTER — PATIENT OUTREACH (OUTPATIENT)
Dept: HEALTH INFORMATION MANAGEMENT | Facility: OTHER | Age: 60
End: 2020-04-06